# Patient Record
Sex: FEMALE | Race: OTHER | NOT HISPANIC OR LATINO
[De-identification: names, ages, dates, MRNs, and addresses within clinical notes are randomized per-mention and may not be internally consistent; named-entity substitution may affect disease eponyms.]

---

## 2017-01-27 ENCOUNTER — NON-APPOINTMENT (OUTPATIENT)
Age: 62
End: 2017-01-27

## 2017-01-27 ENCOUNTER — APPOINTMENT (OUTPATIENT)
Dept: CARDIOLOGY | Facility: CLINIC | Age: 62
End: 2017-01-27

## 2017-01-27 VITALS
HEART RATE: 79 BPM | HEIGHT: 60 IN | OXYGEN SATURATION: 98 % | BODY MASS INDEX: 29.06 KG/M2 | DIASTOLIC BLOOD PRESSURE: 76 MMHG | WEIGHT: 148 LBS | SYSTOLIC BLOOD PRESSURE: 134 MMHG

## 2017-01-30 LAB
ALBUMIN SERPL ELPH-MCNC: 4.8 G/DL
ALP BLD-CCNC: 84 U/L
ALT SERPL-CCNC: 22 U/L
ANION GAP SERPL CALC-SCNC: 18 MMOL/L
AST SERPL-CCNC: 16 U/L
BASOPHILS # BLD AUTO: 0.05 K/UL
BASOPHILS NFR BLD AUTO: 0.6 %
BILIRUB SERPL-MCNC: 0.2 MG/DL
BUN SERPL-MCNC: 26 MG/DL
CALCIUM SERPL-MCNC: 10.3 MG/DL
CHLORIDE SERPL-SCNC: 101 MMOL/L
CHOLEST SERPL-MCNC: 260 MG/DL
CHOLEST/HDLC SERPL: 3.4 RATIO
CO2 SERPL-SCNC: 25 MMOL/L
CREAT SERPL-MCNC: 0.74 MG/DL
EOSINOPHIL # BLD AUTO: 0.16 K/UL
EOSINOPHIL NFR BLD AUTO: 1.8 %
GLUCOSE SERPL-MCNC: 95 MG/DL
HBA1C MFR BLD HPLC: 5.9 %
HCT VFR BLD CALC: 41.7 %
HDLC SERPL-MCNC: 77 MG/DL
HGB BLD-MCNC: 13.3 G/DL
IMM GRANULOCYTES NFR BLD AUTO: 0.2 %
LDLC SERPL CALC-MCNC: 142 MG/DL
LYMPHOCYTES # BLD AUTO: 3.5 K/UL
LYMPHOCYTES NFR BLD AUTO: 39.4 %
MAN DIFF?: NORMAL
MCHC RBC-ENTMCNC: 28.3 PG
MCHC RBC-ENTMCNC: 31.9 GM/DL
MCV RBC AUTO: 88.7 FL
MONOCYTES # BLD AUTO: 0.5 K/UL
MONOCYTES NFR BLD AUTO: 5.6 %
NEUTROPHILS # BLD AUTO: 4.66 K/UL
NEUTROPHILS NFR BLD AUTO: 52.4 %
PLATELET # BLD AUTO: 274 K/UL
POTASSIUM SERPL-SCNC: 3.8 MMOL/L
PROT SERPL-MCNC: 7.3 G/DL
RBC # BLD: 4.7 M/UL
RBC # FLD: 13.9 %
SODIUM SERPL-SCNC: 144 MMOL/L
TRIGL SERPL-MCNC: 204 MG/DL
TSH SERPL-ACNC: 0.94 UIU/ML
WBC # FLD AUTO: 8.89 K/UL

## 2017-06-18 ENCOUNTER — INPATIENT (INPATIENT)
Facility: HOSPITAL | Age: 62
LOS: 3 days | Discharge: HOME | DRG: 390 | End: 2017-06-22
Admitting: HOSPITALIST
Payer: COMMERCIAL

## 2017-06-18 VITALS
SYSTOLIC BLOOD PRESSURE: 162 MMHG | RESPIRATION RATE: 20 BRPM | TEMPERATURE: 98 F | OXYGEN SATURATION: 98 % | DIASTOLIC BLOOD PRESSURE: 77 MMHG | HEART RATE: 71 BPM

## 2017-06-18 LAB
ALBUMIN SERPL ELPH-MCNC: 4.1 G/DL — SIGNIFICANT CHANGE UP (ref 3.3–5)
ALP SERPL-CCNC: 62 U/L — SIGNIFICANT CHANGE UP (ref 40–120)
ALT FLD-CCNC: 32 U/L RC — SIGNIFICANT CHANGE UP (ref 10–45)
ANION GAP SERPL CALC-SCNC: 16 MMOL/L — SIGNIFICANT CHANGE UP (ref 5–17)
APPEARANCE UR: CLEAR — SIGNIFICANT CHANGE UP
APTT BLD: 32.3 SEC — SIGNIFICANT CHANGE UP (ref 27.5–37.4)
AST SERPL-CCNC: 21 U/L — SIGNIFICANT CHANGE UP (ref 10–40)
BACTERIA # UR AUTO: ABNORMAL /HPF
BASE EXCESS BLDV CALC-SCNC: 4.3 MMOL/L — HIGH (ref -2–2)
BASOPHILS # BLD AUTO: 0.1 K/UL — SIGNIFICANT CHANGE UP (ref 0–0.2)
BASOPHILS NFR BLD AUTO: 1 % — SIGNIFICANT CHANGE UP (ref 0–2)
BILIRUB SERPL-MCNC: 0.3 MG/DL — SIGNIFICANT CHANGE UP (ref 0.2–1.2)
BILIRUB UR-MCNC: NEGATIVE — SIGNIFICANT CHANGE UP
BUN SERPL-MCNC: 19 MG/DL — SIGNIFICANT CHANGE UP (ref 7–23)
CA-I SERPL-SCNC: 1.32 MMOL/L — HIGH (ref 1.12–1.3)
CALCIUM SERPL-MCNC: 10 MG/DL — SIGNIFICANT CHANGE UP (ref 8.4–10.5)
CHLORIDE BLDV-SCNC: 101 MMOL/L — SIGNIFICANT CHANGE UP (ref 96–108)
CHLORIDE SERPL-SCNC: 101 MMOL/L — SIGNIFICANT CHANGE UP (ref 96–108)
CO2 BLDV-SCNC: 33 MMOL/L — HIGH (ref 22–30)
CO2 SERPL-SCNC: 26 MMOL/L — SIGNIFICANT CHANGE UP (ref 22–31)
COLOR SPEC: YELLOW — SIGNIFICANT CHANGE UP
CREAT SERPL-MCNC: 0.7 MG/DL — SIGNIFICANT CHANGE UP (ref 0.5–1.3)
DIFF PNL FLD: NEGATIVE — SIGNIFICANT CHANGE UP
EOSINOPHIL # BLD AUTO: 0.1 K/UL — SIGNIFICANT CHANGE UP (ref 0–0.5)
EOSINOPHIL NFR BLD AUTO: 1.2 % — SIGNIFICANT CHANGE UP (ref 0–6)
EPI CELLS # UR: SIGNIFICANT CHANGE UP /HPF
GAS PNL BLDV: 140 MMOL/L — SIGNIFICANT CHANGE UP (ref 136–145)
GAS PNL BLDV: SIGNIFICANT CHANGE UP
GAS PNL BLDV: SIGNIFICANT CHANGE UP
GLUCOSE BLDV-MCNC: 97 MG/DL — SIGNIFICANT CHANGE UP (ref 70–99)
GLUCOSE SERPL-MCNC: 99 MG/DL — SIGNIFICANT CHANGE UP (ref 70–99)
GLUCOSE UR QL: NEGATIVE — SIGNIFICANT CHANGE UP
HCO3 BLDV-SCNC: 31 MMOL/L — HIGH (ref 21–29)
HCT VFR BLD CALC: 37 % — SIGNIFICANT CHANGE UP (ref 34.5–45)
HCT VFR BLDA CALC: 40 % — SIGNIFICANT CHANGE UP (ref 39–50)
HGB BLD CALC-MCNC: 13.1 G/DL — SIGNIFICANT CHANGE UP (ref 11.5–15.5)
HGB BLD-MCNC: 13 G/DL — SIGNIFICANT CHANGE UP (ref 11.5–15.5)
INR BLD: 1.03 RATIO — SIGNIFICANT CHANGE UP (ref 0.88–1.16)
KETONES UR-MCNC: NEGATIVE — SIGNIFICANT CHANGE UP
LACTATE BLDV-MCNC: 1.5 MMOL/L — SIGNIFICANT CHANGE UP (ref 0.7–2)
LEUKOCYTE ESTERASE UR-ACNC: ABNORMAL
LYMPHOCYTES # BLD AUTO: 3.4 K/UL — HIGH (ref 1–3.3)
LYMPHOCYTES # BLD AUTO: 32.9 % — SIGNIFICANT CHANGE UP (ref 13–44)
MCHC RBC-ENTMCNC: 31.4 PG — SIGNIFICANT CHANGE UP (ref 27–34)
MCHC RBC-ENTMCNC: 35.2 GM/DL — SIGNIFICANT CHANGE UP (ref 32–36)
MCV RBC AUTO: 89.3 FL — SIGNIFICANT CHANGE UP (ref 80–100)
MONOCYTES # BLD AUTO: 0.5 K/UL — SIGNIFICANT CHANGE UP (ref 0–0.9)
MONOCYTES NFR BLD AUTO: 4.7 % — SIGNIFICANT CHANGE UP (ref 2–14)
NEUTROPHILS # BLD AUTO: 6.3 K/UL — SIGNIFICANT CHANGE UP (ref 1.8–7.4)
NEUTROPHILS NFR BLD AUTO: 60.2 % — SIGNIFICANT CHANGE UP (ref 43–77)
NITRITE UR-MCNC: NEGATIVE — SIGNIFICANT CHANGE UP
PCO2 BLDV: 57 MMHG — HIGH (ref 35–50)
PH BLDV: 7.35 — SIGNIFICANT CHANGE UP (ref 7.35–7.45)
PH UR: 6.5 — SIGNIFICANT CHANGE UP (ref 5–8)
PLATELET # BLD AUTO: 230 K/UL — SIGNIFICANT CHANGE UP (ref 150–400)
PO2 BLDV: 21 MMHG — LOW (ref 25–45)
POTASSIUM BLDV-SCNC: 3.6 MMOL/L — SIGNIFICANT CHANGE UP (ref 3.5–5)
POTASSIUM SERPL-MCNC: 3.8 MMOL/L — SIGNIFICANT CHANGE UP (ref 3.5–5.3)
POTASSIUM SERPL-SCNC: 3.8 MMOL/L — SIGNIFICANT CHANGE UP (ref 3.5–5.3)
PROT SERPL-MCNC: 6.8 G/DL — SIGNIFICANT CHANGE UP (ref 6–8.3)
PROT UR-MCNC: 30 MG/DL
PROTHROM AB SERPL-ACNC: 11.2 SEC — SIGNIFICANT CHANGE UP (ref 9.8–12.7)
RBC # BLD: 4.14 M/UL — SIGNIFICANT CHANGE UP (ref 3.8–5.2)
RBC # FLD: 11.9 % — SIGNIFICANT CHANGE UP (ref 10.3–14.5)
RBC CASTS # UR COMP ASSIST: SIGNIFICANT CHANGE UP /HPF (ref 0–2)
SAO2 % BLDV: 28 % — LOW (ref 67–88)
SODIUM SERPL-SCNC: 143 MMOL/L — SIGNIFICANT CHANGE UP (ref 135–145)
SP GR SPEC: 1.02 — SIGNIFICANT CHANGE UP (ref 1.01–1.02)
UROBILINOGEN FLD QL: NEGATIVE — SIGNIFICANT CHANGE UP
WBC # BLD: 10.5 K/UL — SIGNIFICANT CHANGE UP (ref 3.8–10.5)
WBC # FLD AUTO: 10.5 K/UL — SIGNIFICANT CHANGE UP (ref 3.8–10.5)
WBC UR QL: SIGNIFICANT CHANGE UP /HPF (ref 0–5)

## 2017-06-18 PROCEDURE — 93010 ELECTROCARDIOGRAM REPORT: CPT | Mod: 59

## 2017-06-18 PROCEDURE — 99285 EMERGENCY DEPT VISIT HI MDM: CPT | Mod: 25

## 2017-06-18 PROCEDURE — 74020: CPT | Mod: 26

## 2017-06-18 PROCEDURE — 74177 CT ABD & PELVIS W/CONTRAST: CPT | Mod: 26

## 2017-06-18 RX ORDER — MORPHINE SULFATE 50 MG/1
4 CAPSULE, EXTENDED RELEASE ORAL EVERY 4 HOURS
Qty: 0 | Refills: 0 | Status: DISCONTINUED | OUTPATIENT
Start: 2017-06-18 | End: 2017-06-19

## 2017-06-18 RX ORDER — SODIUM CHLORIDE 9 MG/ML
1000 INJECTION INTRAMUSCULAR; INTRAVENOUS; SUBCUTANEOUS ONCE
Qty: 0 | Refills: 0 | Status: COMPLETED | OUTPATIENT
Start: 2017-06-18 | End: 2017-06-18

## 2017-06-18 RX ORDER — HYDROCORTISONE 20 MG
100 TABLET ORAL EVERY 8 HOURS
Qty: 0 | Refills: 0 | Status: DISCONTINUED | OUTPATIENT
Start: 2017-06-18 | End: 2017-06-19

## 2017-06-18 RX ORDER — DIPHENHYDRAMINE HCL 50 MG
50 CAPSULE ORAL ONCE
Qty: 0 | Refills: 0 | Status: COMPLETED | OUTPATIENT
Start: 2017-06-18 | End: 2017-06-18

## 2017-06-18 RX ORDER — KETOROLAC TROMETHAMINE 30 MG/ML
15 SYRINGE (ML) INJECTION ONCE
Qty: 0 | Refills: 0 | Status: COMPLETED | OUTPATIENT
Start: 2017-06-18 | End: 2017-06-23

## 2017-06-18 RX ORDER — ONDANSETRON 8 MG/1
8 TABLET, FILM COATED ORAL ONCE
Qty: 0 | Refills: 0 | Status: COMPLETED | OUTPATIENT
Start: 2017-06-18 | End: 2017-06-18

## 2017-06-18 RX ORDER — MORPHINE SULFATE 50 MG/1
6 CAPSULE, EXTENDED RELEASE ORAL ONCE
Qty: 0 | Refills: 0 | Status: DISCONTINUED | OUTPATIENT
Start: 2017-06-18 | End: 2017-06-18

## 2017-06-18 RX ORDER — ONDANSETRON 8 MG/1
4 TABLET, FILM COATED ORAL EVERY 4 HOURS
Qty: 0 | Refills: 0 | Status: DISCONTINUED | OUTPATIENT
Start: 2017-06-18 | End: 2017-06-20

## 2017-06-18 RX ORDER — ACETAMINOPHEN 500 MG
1000 TABLET ORAL ONCE
Qty: 0 | Refills: 0 | Status: COMPLETED | OUTPATIENT
Start: 2017-06-18 | End: 2017-06-18

## 2017-06-18 RX ORDER — KETOROLAC TROMETHAMINE 30 MG/ML
15 SYRINGE (ML) INJECTION ONCE
Qty: 0 | Refills: 0 | Status: DISCONTINUED | OUTPATIENT
Start: 2017-06-18 | End: 2017-06-18

## 2017-06-18 RX ADMIN — SODIUM CHLORIDE 1000 MILLILITER(S): 9 INJECTION INTRAMUSCULAR; INTRAVENOUS; SUBCUTANEOUS at 18:09

## 2017-06-18 RX ADMIN — MORPHINE SULFATE 4 MILLIGRAM(S): 50 CAPSULE, EXTENDED RELEASE ORAL at 21:25

## 2017-06-18 RX ADMIN — ONDANSETRON 8 MILLIGRAM(S): 8 TABLET, FILM COATED ORAL at 18:16

## 2017-06-18 RX ADMIN — MORPHINE SULFATE 4 MILLIGRAM(S): 50 CAPSULE, EXTENDED RELEASE ORAL at 23:00

## 2017-06-18 RX ADMIN — Medication 50 MILLIGRAM(S): at 21:23

## 2017-06-18 RX ADMIN — Medication 100 MILLIGRAM(S): at 18:08

## 2017-06-18 RX ADMIN — Medication 15 MILLIGRAM(S): at 18:55

## 2017-06-18 RX ADMIN — Medication 15 MILLIGRAM(S): at 20:19

## 2017-06-18 RX ADMIN — MORPHINE SULFATE 6 MILLIGRAM(S): 50 CAPSULE, EXTENDED RELEASE ORAL at 20:19

## 2017-06-18 RX ADMIN — Medication 1000 MILLIGRAM(S): at 18:19

## 2017-06-18 RX ADMIN — MORPHINE SULFATE 6 MILLIGRAM(S): 50 CAPSULE, EXTENDED RELEASE ORAL at 18:18

## 2017-06-18 RX ADMIN — Medication 400 MILLIGRAM(S): at 18:04

## 2017-06-18 NOTE — ED PROVIDER NOTE - ATTENDING CONTRIBUTION TO CARE
Patient with nausea/vomiting and decreased bowel movements. Moderate abdominal pain. LLQ in location. Persistent. Patient has not gotten better/improved with this management.   high pitched abdominal sounds, ncat, trachea midline, mild to moderate distress secondary to symptoms. cooperative, alert, no rashes, generally tenderness to palpation, no rebound/guarding, no peritoneal signs, no edema  will get iv, labs, abdominal series, analgesia prn, fluids, surgery consult prn Ct results and reassess  Will follow up on labs, analgesia, reassess and disposition as clinically indicated.

## 2017-06-18 NOTE — ED PROVIDER NOTE - PROGRESS NOTE DETAILS
patient resting comfortably, pain improved, awaiting CT Pasha Bergman MD (resident): pt reassessed after signout and has abd pain, generalized worse in the LLQ, TTP in that region, passing gas and not concerned for SBO. Since pt continues to have pain, would prefere to stay in the hospital for further w/u of CT findings. GI called, awaiting call back. D/w GI Dr. Ramirez, who will assess pt in house. D/w Dr. Berger who agrees w/ plan of care

## 2017-06-18 NOTE — ED ADULT NURSE NOTE - CHPI ED SYMPTOMS NEG
no fever/no vomiting/no hematuria/no diarrhea/no abdominal distension/no dysuria/no burning urination/no chills

## 2017-06-18 NOTE — ED PROVIDER NOTE - OBJECTIVE STATEMENT
62 year old female, past medical history HTN, asthma, who presents to the ED for abdominal pain for 2 days. Reports diffuse abdominal pain, worse left lower quadrant, reports it radiates up towards epigastrium, peptobismul and tylenol without relief, no aggravating factors, unchanged with food, 8/10, sharp, nausea without vomiting, no fevers or chills. No chest pain or shortness of breath. No diarrhea, constipation, hematochezia, melena. No sick contacts or recent travel. No recent abx. Last colonoscopy was 1 year ago which patients reports was normal.     primary medical doctor: Dr. Rafy Willard ([place])   GI: Dr. Marita Rinaldi

## 2017-06-18 NOTE — ED ADULT NURSE NOTE - OBJECTIVE STATEMENT
62y female pt arrived to ED aox4 c/o generalized abd pain mainly on LLQ with nausea started last night. No vomit/diarrhea, no fever/chills, no chest pain or sob, tender on LLQ with palpation, denies any urinary deficits, normal BM this morning. Tried Pepto Bismol and Tylenol without relief.

## 2017-06-18 NOTE — ED PROVIDER NOTE - MEDICAL DECISION MAKING DETAILS
62 year old female, past medical history HTN, asthma, who presents to the ED for abdominal pain for 2 days. Likely Diverticulitis vs colitis. patient with rebound will obtain xray for perforation. Low supicion though as patient without guarding and abdomen is soft. Will obtain labwork, ct (reports rash with iodine so will pretreat). pain and nausea control.

## 2017-06-19 DIAGNOSIS — R10.9 UNSPECIFIED ABDOMINAL PAIN: ICD-10-CM

## 2017-06-19 DIAGNOSIS — Z29.9 ENCOUNTER FOR PROPHYLACTIC MEASURES, UNSPECIFIED: ICD-10-CM

## 2017-06-19 DIAGNOSIS — Z98.890 OTHER SPECIFIED POSTPROCEDURAL STATES: Chronic | ICD-10-CM

## 2017-06-19 DIAGNOSIS — J45.909 UNSPECIFIED ASTHMA, UNCOMPLICATED: ICD-10-CM

## 2017-06-19 DIAGNOSIS — I10 ESSENTIAL (PRIMARY) HYPERTENSION: ICD-10-CM

## 2017-06-19 LAB
CULTURE RESULTS: NO GROWTH — SIGNIFICANT CHANGE UP
SPECIMEN SOURCE: SIGNIFICANT CHANGE UP

## 2017-06-19 PROCEDURE — 99223 1ST HOSP IP/OBS HIGH 75: CPT | Mod: GC

## 2017-06-19 PROCEDURE — 71010: CPT | Mod: 26

## 2017-06-19 RX ORDER — FAMOTIDINE 10 MG/ML
20 INJECTION INTRAVENOUS
Qty: 0 | Refills: 0 | Status: DISCONTINUED | OUTPATIENT
Start: 2017-06-19 | End: 2017-06-20

## 2017-06-19 RX ORDER — SODIUM CHLORIDE 9 MG/ML
1000 INJECTION INTRAMUSCULAR; INTRAVENOUS; SUBCUTANEOUS
Qty: 0 | Refills: 0 | Status: DISCONTINUED | OUTPATIENT
Start: 2017-06-19 | End: 2017-06-20

## 2017-06-19 RX ORDER — METOPROLOL TARTRATE 50 MG
0 TABLET ORAL
Qty: 0 | Refills: 0 | COMMUNITY

## 2017-06-19 RX ORDER — SENNA PLUS 8.6 MG/1
2 TABLET ORAL AT BEDTIME
Qty: 0 | Refills: 0 | Status: DISCONTINUED | OUTPATIENT
Start: 2017-06-19 | End: 2017-06-19

## 2017-06-19 RX ORDER — ACETAMINOPHEN 500 MG
1000 TABLET ORAL ONCE
Qty: 0 | Refills: 0 | Status: DISCONTINUED | OUTPATIENT
Start: 2017-06-19 | End: 2017-06-20

## 2017-06-19 RX ORDER — OXYCODONE HYDROCHLORIDE 5 MG/1
5 TABLET ORAL EVERY 6 HOURS
Qty: 0 | Refills: 0 | Status: DISCONTINUED | OUTPATIENT
Start: 2017-06-19 | End: 2017-06-20

## 2017-06-19 RX ORDER — METOPROLOL TARTRATE 50 MG
50 TABLET ORAL DAILY
Qty: 0 | Refills: 0 | Status: DISCONTINUED | OUTPATIENT
Start: 2017-06-19 | End: 2017-06-19

## 2017-06-19 RX ORDER — MORPHINE SULFATE 50 MG/1
2 CAPSULE, EXTENDED RELEASE ORAL EVERY 8 HOURS
Qty: 0 | Refills: 0 | Status: DISCONTINUED | OUTPATIENT
Start: 2017-06-19 | End: 2017-06-20

## 2017-06-19 RX ORDER — DOCUSATE SODIUM 100 MG
100 CAPSULE ORAL THREE TIMES A DAY
Qty: 0 | Refills: 0 | Status: DISCONTINUED | OUTPATIENT
Start: 2017-06-19 | End: 2017-06-19

## 2017-06-19 RX ORDER — METOPROLOL TARTRATE 50 MG
1 TABLET ORAL
Qty: 0 | Refills: 0 | COMMUNITY

## 2017-06-19 RX ORDER — MORPHINE SULFATE 50 MG/1
1 CAPSULE, EXTENDED RELEASE ORAL ONCE
Qty: 0 | Refills: 0 | Status: DISCONTINUED | OUTPATIENT
Start: 2017-06-19 | End: 2017-06-19

## 2017-06-19 RX ORDER — RANITIDINE HYDROCHLORIDE 150 MG/1
2 TABLET, FILM COATED ORAL
Qty: 0 | Refills: 0 | COMMUNITY

## 2017-06-19 RX ORDER — METOPROLOL TARTRATE 50 MG
25 TABLET ORAL
Qty: 0 | Refills: 0 | Status: DISCONTINUED | OUTPATIENT
Start: 2017-06-19 | End: 2017-06-20

## 2017-06-19 RX ORDER — CYCLOBENZAPRINE HYDROCHLORIDE 10 MG/1
1 TABLET, FILM COATED ORAL
Qty: 0 | Refills: 0 | COMMUNITY

## 2017-06-19 RX ORDER — ENOXAPARIN SODIUM 100 MG/ML
40 INJECTION SUBCUTANEOUS DAILY
Qty: 0 | Refills: 0 | Status: DISCONTINUED | OUTPATIENT
Start: 2017-06-19 | End: 2017-06-22

## 2017-06-19 RX ORDER — ACETAMINOPHEN 500 MG
1000 TABLET ORAL EVERY 8 HOURS
Qty: 0 | Refills: 0 | Status: COMPLETED | OUTPATIENT
Start: 2017-06-19 | End: 2017-06-19

## 2017-06-19 RX ORDER — CHLORTHALIDONE 50 MG
1 TABLET ORAL
Qty: 0 | Refills: 0 | COMMUNITY

## 2017-06-19 RX ADMIN — MORPHINE SULFATE 1 MILLIGRAM(S): 50 CAPSULE, EXTENDED RELEASE ORAL at 20:05

## 2017-06-19 RX ADMIN — MORPHINE SULFATE 4 MILLIGRAM(S): 50 CAPSULE, EXTENDED RELEASE ORAL at 05:41

## 2017-06-19 RX ADMIN — OXYCODONE HYDROCHLORIDE 5 MILLIGRAM(S): 5 TABLET ORAL at 22:20

## 2017-06-19 RX ADMIN — ENOXAPARIN SODIUM 40 MILLIGRAM(S): 100 INJECTION SUBCUTANEOUS at 12:58

## 2017-06-19 RX ADMIN — OXYCODONE HYDROCHLORIDE 5 MILLIGRAM(S): 5 TABLET ORAL at 23:20

## 2017-06-19 RX ADMIN — MORPHINE SULFATE 4 MILLIGRAM(S): 50 CAPSULE, EXTENDED RELEASE ORAL at 01:07

## 2017-06-19 RX ADMIN — FAMOTIDINE 20 MILLIGRAM(S): 10 INJECTION INTRAVENOUS at 13:03

## 2017-06-19 RX ADMIN — SODIUM CHLORIDE 75 MILLILITER(S): 9 INJECTION INTRAMUSCULAR; INTRAVENOUS; SUBCUTANEOUS at 09:40

## 2017-06-19 RX ADMIN — Medication 100 MILLIGRAM(S): at 06:14

## 2017-06-19 RX ADMIN — MORPHINE SULFATE 2 MILLIGRAM(S): 50 CAPSULE, EXTENDED RELEASE ORAL at 14:53

## 2017-06-19 RX ADMIN — Medication 25 MILLIGRAM(S): at 18:04

## 2017-06-19 RX ADMIN — MORPHINE SULFATE 1 MILLIGRAM(S): 50 CAPSULE, EXTENDED RELEASE ORAL at 20:20

## 2017-06-19 RX ADMIN — SODIUM CHLORIDE 75 MILLILITER(S): 9 INJECTION INTRAMUSCULAR; INTRAVENOUS; SUBCUTANEOUS at 18:06

## 2017-06-19 RX ADMIN — MORPHINE SULFATE 4 MILLIGRAM(S): 50 CAPSULE, EXTENDED RELEASE ORAL at 05:28

## 2017-06-19 RX ADMIN — ONDANSETRON 4 MILLIGRAM(S): 8 TABLET, FILM COATED ORAL at 12:58

## 2017-06-19 RX ADMIN — Medication 400 MILLIGRAM(S): at 09:40

## 2017-06-19 RX ADMIN — MORPHINE SULFATE 4 MILLIGRAM(S): 50 CAPSULE, EXTENDED RELEASE ORAL at 03:39

## 2017-06-19 RX ADMIN — Medication 1000 MILLIGRAM(S): at 10:10

## 2017-06-19 RX ADMIN — Medication 100 MILLIGRAM(S): at 14:53

## 2017-06-19 NOTE — H&P ADULT - PROBLEM SELECTOR PLAN 3
-resume home metoprolol and thiazide-like drug  -monitor BP -resume home metoprolol and HCTZ  -monitor BP

## 2017-06-19 NOTE — PROVIDER CONTACT NOTE (OTHER) - SITUATION
Pt reports no bm since yesterday yet abdomen distended. Pt reports passing gas with blood tinged on toilet paper tonight.

## 2017-06-19 NOTE — ED ADULT NURSE REASSESSMENT NOTE - NS ED NURSE REASSESS COMMENT FT1
Back from CT, no distress noted at this time, pending result for disposition.
Medicated for pain as ordered and pending GI consult.
Pt resting in hallway, has some complaints of increasing pain, additional pain medication given, Pt tolerated well, VSS IV site clear ro redness or swelling, a&ox4, waiting for room disposition.
Received report from nurse Karen Flores.  No acute distress noted, v/s obtained.  Pt admitted, waiting bed assignment.
Pt states that pain is much tolerable after multiple pain meds, drinking oral contrast for CT and pending CT between [id number].

## 2017-06-19 NOTE — H&P ADULT - PROBLEM SELECTOR PLAN 1
-possibly partial SBO as per CT scan  -NPO for now  -no NGT warranted as per GI  -no surgical intervention at this point as per surgery  -pain control w/Ofirmev -likely partial SBO as per CT scan  -NPO for now  -no NGT warranted as per GI  -no surgical intervention at this point as per surgery  -pain control w/Ofirmev  -monitor vitals  -zofran for nausea -likely SBO as per CT scan  -NPO for now  -no NGT warranted as per GI  -no surgical intervention at this point as per surgery  -pain control w/Ofirmev  -monitor vitals, CBC, lactate  -zofran for nausea

## 2017-06-19 NOTE — CONSULT NOTE ADULT - SUBJECTIVE AND OBJECTIVE BOX
GI CONSULT NOTE  SUBJECTIVE:  61 yo woman known to me.  Yesterday morning, awoke with diffuse abdominal pain, but worst in the LLQ.  She had a normal, formed bowel movement -- non-bloody.  Patient took Pepto Bismol for "stomach ache", and went shopping at [employer], but had to leave [employer] because she was feeling the abdominal pain.  She called her primary doctor, Dr. Willard, and he advised that she try aspirin, but if no relief, then to consider coming to the hospital.  Patient arrived to ER triage about 2 pm, and waited about 2 - 2.5 hours to be seen by ER team (patient's  called me while she was waiting, complaining that she was in pain, and that the emergency room was busy).  The patient finished drinking her CT scan contrast in the evening, and underwent CT scan of the abdomen, which showed dilated small bowel loops with multiple transition points and SB luminal filling defects.    The patient's abdominal surgical history includes right sided ovarian cyst removal in  -- per patient, prior to surgery, endometriosis was suspected, but was not confirmed after surgery.  Per patient, "I had 19 cysts".  The patient conceived her son, and he was born via  in 2000.  During her , "they nicked my bowel, and it had to be sewn".  The patient did have one SBO after her , which resolved without surgical intervention.  The patient has had chronic intermittent abdominal pains.  She also gets acid reflux symptoms.  She had an EGD/colonoscopy by me on 16, and both were normal aside from hemorrhoids noted during colonoscopy.      The patient now reports feeling better, though still distended.  She has not yet passed the oral CT scan contrast per anorectum.  She is mildly nauseated and feeling acid reflux, though did not vomit any time yesterday or this morning.  No fevers or chills.  She expresses concerns about this week, because her 16 yo son (a senior in high school who will be attending [employer] in the fall) has his prom 17, and his graduation 17.    ______________________________________________________________________  PMH/PSH:  PAST MEDICAL & SURGICAL HISTORY:  HTN (hypertension)  History of ovarian surgery ()  History of  Section (2000)  Asthma    ______________________________________________________________________  MEDS:  MEDICATIONS  (STANDING):  hydrocortisone  Injectable 100milliGRAM(s) IV Push every 8 hours    MEDICATIONS  (PRN):  ondansetron Injectable 4milliGRAM(s) IV Push every 4 hours PRN Nausea and/or Vomiting    ______________________________________________________________________  ALL:   iodine (Unknown)  latex (Unknown)  No Known Drug Allergies  ______________________________________________________________________  SH:  Patient is a dental hygienist.  Her  is a dentist.  They have one son.  Patient denies toxic habits.    ______________________________________________________________________  FH:  Unremarkable    ______________________________________________________________________  ROS:  General:  Denies weight loss.  In fact, weight gain.  Skin/Breast:  Mild neck itching after IV CT scan contrast.  Patient received prophylaxis with benadryl.  There is no visible rash at this time.  Ophthalmologic:  Normal. Vision correction.  ENMT:	Normal  Respiratory and Thorax:  Normal  Cardiovascular:	Normal  Gastrointestinal:	  As above  Genitourinary:	Normal  Musculoskeletal:  Normal  Neurological:  Normal  Psychiatric:  Normal  Hematology/Lymphatics:  Normal	  Endocrine:  Normal  ______________________________________________________________________  PHYSICAL EXAM:  T(C): 36.6, Max: 36.7 ( @ 17:52)  HR: 67  BP: 128/69  RR: 16  SpO2: 95%  Wt(kg): --  PHYSICAL EXAM:    Constitutional:  Lying on ER stretcher, NAD.  Talking to her  by phone.  HEENT:  Normal  Neck:  Supple  Respiratory:  Clear bilaterally  Cardiovascular:  Regular  Gastrointestinal:  Distended, soft.  Mild tenderness mostly in the LLQ.  No rebound.  No guard.  Rectal:  Deferred.  Patient in the ER hallway.  Extremities:   Normal  Neurological:  Normal    ______________________________________________________________________  LABS:                        13.0   10.5  )-----------( 230      ( 2017 18:09 )             37.0     06-18    143  |  101  |  19  ----------------------------<  99  3.8   |  26  |  0.70    Ca    10.0      2017 18:09    TPro  6.8  /  Alb  4.1  /  TBili  0.3  /  DBili  x   /  AST  21  /  ALT  32  /  AlkPhos  62  18    LIVER FUNCTIONS - ( 2017 18:09 )  Alb: 4.1 g/dL / Pro: 6.8 g/dL / ALK PHOS: 62 U/L / ALT: 32 U/L RC / AST: 21 U/L / GGT: x           PT/INR - ( 2017 18:09 )   PT: 11.2 sec;   INR: 1.03 ratio         PTT - ( 2017 18:09 )  PTT:32.3 sec    CT abd/pel findings as above.    ______________________________________________________________________  ASSESSMENT:  I suspect that the patient has a small bowel obstruction secondary to adhesions from prior surgeries.  SB luminal filling defects hopefully food debris, and not anything malignant.      PLAN:  - Admit for pain management, observation.  - Surgery consultation.  - Keep NPO for now.  Will monitor symptoms, bowel movements.  - I do not think that the patient needs an NGT now.    The accepting hospitalist per [place] is Dr. Rhonda Berger.  I left a message with the [facility] answering service.  Alecia Rinaldi MD  (O) [phone]

## 2017-06-19 NOTE — CONSULT NOTE ADULT - SUBJECTIVE AND OBJECTIVE BOX
[facility] SURGERY (#[id number]) CONSULT NOTE  ---------------------------------------------------------------------------------------------     HPI: Patient is a 62y old  Female who presents with a chief complaint of abdominal pain since yesterday morning.  Patient also had some nausea, no emesis. Patient had a normal bowel movement yesterday morning as well, with no BM or flatus since then.  Patient has been complaining of increasing "reflux"-like symptoms for the past several months, and now takes Zantac twice a day for symptom control.  Patient admitted to the medical service, GI consulted and requested surgical consult for following the bowel obstruction.  Per patient, last colonoscopy within the past 1 year with no significant findings, never had upper endoscopy.      ROS: 10-system review is otherwise negative except HPI above.      PAST MEDICAL & SURGICAL HISTORY:  HTN (hypertension)  History of Myomectomy  History of  Section with SBR for small bowel injury  Asthma    FAMILY HISTORY:  [] Family history not pertinent as reviewed with the patient and family    ALLERGIES: iodine (Unknown)  latex (Unknown)  No Known Drug Allergies    HOME MEDICATIONS:     CURRENT MEDICATIONS  MEDICATIONS (STANDING): hydrocortisone  Injectable 100milliGRAM(s) IV Push every 8 hours  sodium chloride 0.9%. 1000milliLiter(s) IV Continuous <Continuous>  docusate sodium 100milliGRAM(s) Oral three times a day  metoprolol 25milliGRAM(s) Oral two times a day    MEDICATIONS (PRN):ondansetron Injectable 4milliGRAM(s) IV Push every 4 hours PRN Nausea and/or Vomiting  aluminum hydroxide/magnesium hydroxide/simethicone Suspension 30milliLiter(s) Oral every 4 hours PRN Dyspepsia  senna 2Tablet(s) Oral at bedtime PRN Constipation    --------------------------------------------------------------------------------------------    Vitals:   T(C): 36.6, Max: 36.7 ( @ 17:52)  HR: 67 (59 - 71)  BP: 128/69 (128/69 - 166/81)  BP(mean): --  RR: 16 (16 - 20)  SpO2: 95% (95% - 100%)  Wt(kg): --  CAPILLARY BLOOD GLUCOSE      PHYSICAL EXAM:   General: NAD, Lying in bed, slightly uncomfortable secondary to abdominal pain  Neuro: A+Ox3  HEENT: NC/AT, EOMI  Cardio: RRR, nml S1/S2  Resp: Good effort, CTA b/l  GI/Abd: Soft, mildly distended, mild tenderness to deep palpation in left upper and left lower quadrants, no guarding, no masses palpated  Vascular: All 4 extremities warm.  Musculoskeletal: All 4 extremities moving spontaneously, no limitations    --------------------------------------------------------------------------------------------    LABS  CBC ( 18:09)                              13.0                           10.5    )----------------(  230        60.2  % Neutrophils, 32.9  % Lymphocytes, ANC: 6.3                                 37.0      BMP ( 18:09)             143     |  101     |  19    		Ca++ --      Ca 10.0               ---------------------------------( 99    		Mg --                 3.8     |  26      |  0.70  			Ph --        LFTs ( 18:09)      TPro 6.8 / Alb 4.1 / TBili 0.3 / DBili -- / AST 21 / ALT 32 / AlkPhos 62    Coags ( 18:09)  aPTT 32.3 / INR 1.03 / PT 11.2    VBG ( @ 18:09)     7.35 / 57<H> / 21<L> / 31<H> / 4.3<H> / 28<L>%     Lactate: 1.5    --------------------------------------------------------------------------------------------    MICROBIOLOGY  Urinalysis ( @ 18:09):     Color: Yellow / Appearance: Clear / S.025 / pH: 6.5 / Gluc: Negative / Ketones: Negative / Bili: Negative / Urobili: Negative / Protein :30<!> / Nitrites: Negative / Leuk.Est: Small<!> / RBC: 3-5 / WBC: 3-5 / Sq Epi:  / Non Sq Epi: OCC / Bacteria Few<!>         --------------------------------------------------------------------------------------------    IMAGING  CT: Dilated small bowel loops with multiple transition points an apparent luminal filling defects. Further evaluation is recommended.    --------------------------------------------------------------------------------------------

## 2017-06-19 NOTE — H&P ADULT - NSHPLABSRESULTS_GEN_ALL_CORE
CT a/p 6/18:  Dilated small bowel loops with multiple transition points an apparent   luminal filling defects. Further evaluation is recommended. CT a/p 6/18:  Dilated small bowel loops with multiple transition points an apparent   luminal filling defects. Further evaluation is recommended.    CXR 6/18:  IMPRESSION: Clear lungs.    ABXR 6/18:  IMPRESSION: Nonobstructive bowel gas pattern. If further evaluation for   pneumoperitoneum is needed a lateral decubitus film may be performed.     Follow-up ordered CT abdomen and pelvis.    EKG: sinus charmaine @59

## 2017-06-19 NOTE — CONSULT NOTE ADULT - ASSESSMENT
ASSESSMENT: Patient is a 62y old f with abdominal pain, likely 2/2 SBO with multiple transition points    PLAN:    - No acute surgical intervention  - Serial abdominal exams  - Trend WBC and lactate  - If patient has worsening abdominal pain or increasing pain medicine requirements, or worsening nausea and/or emesis, patient will need an NGT placed for decompression.  Discussed this with patient, she is agreeable if needed.    - Plan discussed with Attending, Dr. Clark ASSESSMENT: Patient is a 62y old f with abdominal pain, likely 2/2 SBO with multiple transition points    PLAN:    - No acute surgical intervention  - Serial abdominal exams  - NPO, f/u GI function.  - Trend WBC and lactate  - If patient has worsening abdominal pain or increasing pain medicine requirements, or worsening nausea and/or emesis, patient will need an NGT placed for decompression.  Discussed this with patient, she is agreeable if needed.    - Plan discussed with Attending, Dr. Clark

## 2017-06-19 NOTE — H&P ADULT - HISTORY OF PRESENT ILLNESS
61 yo F w/PMHx GERD, HTN, asthma (well-controlled), hx spontaneously-resolving SBO and multiple abdominal surgeries p/w dull, non-radiating LLQ abdominal pain x 1 day. Pt had it yesterday AM and normal, formed BM at that time. She took Pepto-Bismol and aspirin, but after symptoms did not resolve, on recommendation of her PMD Dr. Willard, pt presented to ER for more acute medical care. Denies F/C, dizziness, nausea/vomiting, association with food intake, melena/hematochezia, dysuria, recent diet changes, medication changes, travel. Previous  EGD/colonoscopy on 5/13/16 normal aside from hemorrhoids noted during colonoscopy.      In the ED, pt vitals were:  T= 36.5 HR= 71 BP= 162/77 RR= 20 O2SAT= 98% RA  There, she was given a bolus of NS and several courses of morphine with improvement of symptoms. CT scan of the abdomen showed dilated small bowel loops with multiple transition points and SB luminal filling defects. 61 yo F w/PMHx GERD, HTN, asthma (well-controlled), hx spontaneously-resolving SBO and multiple abdominal surgeries p/w dull, non-radiating LLQ abdominal pain x 1 day. Pt started experiencing progressive pain in her abdomen yesterday AM and had a normal, formed BM at that time. She had mild nausea at that point but no vomiting. Pt took Pepto-Bismol and aspirin, but after symptoms did not resolve, on recommendation of her PMD Dr. Willard, pt presented to ER for further medical evaluation. Denies F/C, dizziness, vomiting, association with food intake, melena/hematochezia, dysuria, recent diet changes, medication changes, travel. Previous  EGD/colonoscopy on 5/13/16 normal aside from hemorrhoids noted during colonoscopy.      In the ED, pt vitals were:  T= 36.5 HR= 71 BP= 162/77 RR= 20 O2SAT= 98% RA  There, she was given a bolus of NS and several courses of morphine with improvement of symptoms. CT scan of the abdomen showed dilated small bowel loops with multiple transition points and SB luminal filling defects.

## 2017-06-19 NOTE — H&P ADULT - PSH
Delivery by (planned)  section occurring after 37 completed weeks of gestation but before 39 completed weeks gestation due to (spontaneous) onset of labor, with mention of postpartum complication    H/O myomectomy

## 2017-06-19 NOTE — H&P ADULT - NSHPREVIEWOFSYSTEMS_GEN_ALL_CORE
Denies headaches, F/C, dizziness, syncope, CP/SOB, N/V, abdominal pain, dysuria, changes in BM, peripheral swelling, skin changes, new joint aches. Tolerating PO, ambulatory. Denies headaches, F/C, dizziness, syncope, CP/SOB, N/V, dysuria, changes in BM, peripheral swelling, skin changes, new joint aches.

## 2017-06-19 NOTE — H&P ADULT - ASSESSMENT
63 yo F w/PMHx GERD, HTN, asthma (well-controlled), hx spontaneously-resolving SBO and multiple abdominal surgeries p/w dull, non-radiating LLQ abdominal pain x 1 day; CT scan shows dilated loops of bowel - likely SBO 2/2 adhesions from multiple abdominal surgeries.

## 2017-06-19 NOTE — H&P ADULT - NSHPSOCIALHISTORY_GEN_ALL_CORE
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 social alcohol use; CAGE negative.

## 2017-06-19 NOTE — H&P ADULT - PMH
Asthma    Gastroesophageal reflux disease, esophagitis presence not specified    History of  Section, Unknown Scar    History of Myomectomy    HTN (hypertension)

## 2017-06-19 NOTE — H&P ADULT - NSHPPHYSICALEXAM_GEN_ALL_CORE
On physical exam:  Gen: NAD overweight female  ENT:  Chest/CV: RRR, +2 peripheral pulses  Pulm: CTAB/L  Abd: soft, NT, ND +BS  MSK: no peripheral edema/cyanosis   Skin: no rashes On physical exam:  Gen: NAD overweight female  ENT: PERRLA, EOMI  Neck: soft, supple, no thyromegaly  Chest/CV: RRR, +2 peripheral pulses  Pulm: CTAB/L  Abd/GI: soft, distended, BSX4, mild tenderness to palpation LLQ and LUQ, no rebound, no guarding, no tympany to percussion  MSK: no peripheral edema/cyanosis   Skin: no rashes  Heme/lymph: no ecchymoses, no cervical lymphadenopathy  Neuro: CN 2-12 grossly intact  Psych: alert and oriented, normal mood and affect Vital Signs Last 24 Hrs  T(C): 36.4, Max: 36.7 (06-18 @ 17:52)  T(F): 97.6, Max: 98.1 (06-18 @ 17:52)  HR: 65 (59 - 69)  BP: 124/70 (109/65 - 166/81)  BP(mean): --  RR: 18 (16 - 18)  SpO2: 96% (95% - 100%)    On physical exam:  Gen: NAD overweight female  ENT: PERRLA, EOMI  Neck: soft, supple, no thyromegaly  Chest/CV: RRR, +2 peripheral pulses  Pulm: CTAB/L  Abd/GI: soft, distended, BSX4, mild tenderness to palpation LLQ and LUQ, no rebound, no guarding, no tympany to percussion  MSK: no peripheral edema/cyanosis   Skin: no rashes  Heme/lymph: no ecchymoses, no cervical lymphadenopathy  Neuro: CN 2-12 grossly intact  Psych: alert and oriented, normal mood and affect

## 2017-06-20 DIAGNOSIS — K56.69 OTHER INTESTINAL OBSTRUCTION: ICD-10-CM

## 2017-06-20 LAB
ANION GAP SERPL CALC-SCNC: 13 MMOL/L — SIGNIFICANT CHANGE UP (ref 5–17)
ANION GAP SERPL CALC-SCNC: 14 MMOL/L — SIGNIFICANT CHANGE UP (ref 5–17)
BUN SERPL-MCNC: 7 MG/DL — SIGNIFICANT CHANGE UP (ref 7–23)
BUN SERPL-MCNC: 9 MG/DL — SIGNIFICANT CHANGE UP (ref 7–23)
CALCIUM SERPL-MCNC: 8 MG/DL — LOW (ref 8.4–10.5)
CALCIUM SERPL-MCNC: 9.1 MG/DL — SIGNIFICANT CHANGE UP (ref 8.4–10.5)
CHLORIDE SERPL-SCNC: 103 MMOL/L — SIGNIFICANT CHANGE UP (ref 96–108)
CHLORIDE SERPL-SCNC: 105 MMOL/L — SIGNIFICANT CHANGE UP (ref 96–108)
CO2 SERPL-SCNC: 25 MMOL/L — SIGNIFICANT CHANGE UP (ref 22–31)
CO2 SERPL-SCNC: 26 MMOL/L — SIGNIFICANT CHANGE UP (ref 22–31)
CREAT SERPL-MCNC: 0.71 MG/DL — SIGNIFICANT CHANGE UP (ref 0.5–1.3)
CREAT SERPL-MCNC: 0.81 MG/DL — SIGNIFICANT CHANGE UP (ref 0.5–1.3)
GLUCOSE SERPL-MCNC: 92 MG/DL — SIGNIFICANT CHANGE UP (ref 70–99)
GLUCOSE SERPL-MCNC: 95 MG/DL — SIGNIFICANT CHANGE UP (ref 70–99)
HCT VFR BLD CALC: 33.8 % — LOW (ref 34.5–45)
HGB BLD-MCNC: 11.1 G/DL — LOW (ref 11.5–15.5)
LACTATE SERPL-SCNC: 0.9 MMOL/L — SIGNIFICANT CHANGE UP (ref 0.7–2)
MAGNESIUM SERPL-MCNC: 1.8 MG/DL — SIGNIFICANT CHANGE UP (ref 1.6–2.6)
MCHC RBC-ENTMCNC: 28.6 PG — SIGNIFICANT CHANGE UP (ref 27–34)
MCHC RBC-ENTMCNC: 32.8 GM/DL — SIGNIFICANT CHANGE UP (ref 32–36)
MCV RBC AUTO: 87.1 FL — SIGNIFICANT CHANGE UP (ref 80–100)
PHOSPHATE SERPL-MCNC: 2.3 MG/DL — LOW (ref 2.5–4.5)
PLATELET # BLD AUTO: 205 K/UL — SIGNIFICANT CHANGE UP (ref 150–400)
POTASSIUM SERPL-MCNC: 3.1 MMOL/L — LOW (ref 3.5–5.3)
POTASSIUM SERPL-MCNC: 3.3 MMOL/L — LOW (ref 3.5–5.3)
POTASSIUM SERPL-SCNC: 3.1 MMOL/L — LOW (ref 3.5–5.3)
POTASSIUM SERPL-SCNC: 3.3 MMOL/L — LOW (ref 3.5–5.3)
RBC # BLD: 3.88 M/UL — SIGNIFICANT CHANGE UP (ref 3.8–5.2)
RBC # FLD: 13.6 % — SIGNIFICANT CHANGE UP (ref 10.3–14.5)
SODIUM SERPL-SCNC: 143 MMOL/L — SIGNIFICANT CHANGE UP (ref 135–145)
SODIUM SERPL-SCNC: 143 MMOL/L — SIGNIFICANT CHANGE UP (ref 135–145)
WBC # BLD: 6.7 K/UL — SIGNIFICANT CHANGE UP (ref 3.8–10.5)
WBC # FLD AUTO: 6.7 K/UL — SIGNIFICANT CHANGE UP (ref 3.8–10.5)

## 2017-06-20 PROCEDURE — 74000: CPT | Mod: 26

## 2017-06-20 PROCEDURE — 99233 SBSQ HOSP IP/OBS HIGH 50: CPT | Mod: GC

## 2017-06-20 RX ORDER — IPRATROPIUM/ALBUTEROL SULFATE 18-103MCG
3 AEROSOL WITH ADAPTER (GRAM) INHALATION ONCE
Qty: 0 | Refills: 0 | Status: COMPLETED | OUTPATIENT
Start: 2017-06-20 | End: 2017-06-20

## 2017-06-20 RX ORDER — POTASSIUM PHOSPHATE, MONOBASIC POTASSIUM PHOSPHATE, DIBASIC 236; 224 MG/ML; MG/ML
15 INJECTION, SOLUTION INTRAVENOUS ONCE
Qty: 0 | Refills: 0 | Status: COMPLETED | OUTPATIENT
Start: 2017-06-20 | End: 2017-06-20

## 2017-06-20 RX ORDER — IPRATROPIUM/ALBUTEROL SULFATE 18-103MCG
3 AEROSOL WITH ADAPTER (GRAM) INHALATION EVERY 6 HOURS
Qty: 0 | Refills: 0 | Status: DISCONTINUED | OUTPATIENT
Start: 2017-06-20 | End: 2017-06-22

## 2017-06-20 RX ORDER — METOPROLOL TARTRATE 50 MG
5 TABLET ORAL EVERY 6 HOURS
Qty: 0 | Refills: 0 | Status: DISCONTINUED | OUTPATIENT
Start: 2017-06-20 | End: 2017-06-22

## 2017-06-20 RX ORDER — ALBUTEROL 90 UG/1
1 AEROSOL, METERED ORAL EVERY 4 HOURS
Qty: 0 | Refills: 0 | Status: DISCONTINUED | OUTPATIENT
Start: 2017-06-20 | End: 2017-06-20

## 2017-06-20 RX ORDER — SODIUM CHLORIDE 9 MG/ML
1000 INJECTION, SOLUTION INTRAVENOUS
Qty: 0 | Refills: 0 | Status: DISCONTINUED | OUTPATIENT
Start: 2017-06-20 | End: 2017-06-21

## 2017-06-20 RX ORDER — MAGNESIUM SULFATE 500 MG/ML
2 VIAL (ML) INJECTION ONCE
Qty: 0 | Refills: 0 | Status: COMPLETED | OUTPATIENT
Start: 2017-06-20 | End: 2017-06-20

## 2017-06-20 RX ORDER — POTASSIUM CHLORIDE 20 MEQ
10 PACKET (EA) ORAL
Qty: 0 | Refills: 0 | Status: COMPLETED | OUTPATIENT
Start: 2017-06-20 | End: 2017-06-20

## 2017-06-20 RX ADMIN — ENOXAPARIN SODIUM 40 MILLIGRAM(S): 100 INJECTION SUBCUTANEOUS at 13:00

## 2017-06-20 RX ADMIN — Medication 25 MILLIGRAM(S): at 05:09

## 2017-06-20 RX ADMIN — Medication 3 MILLILITER(S): at 04:06

## 2017-06-20 RX ADMIN — Medication 100 MILLIEQUIVALENT(S): at 13:00

## 2017-06-20 RX ADMIN — Medication 5 MILLIGRAM(S): at 23:20

## 2017-06-20 RX ADMIN — Medication 100 MILLIEQUIVALENT(S): at 11:12

## 2017-06-20 RX ADMIN — Medication 100 MILLIEQUIVALENT(S): at 14:42

## 2017-06-20 RX ADMIN — SODIUM CHLORIDE 100 MILLILITER(S): 9 INJECTION, SOLUTION INTRAVENOUS at 11:12

## 2017-06-20 RX ADMIN — Medication 50 GRAM(S): at 16:44

## 2017-06-20 RX ADMIN — POTASSIUM PHOSPHATE, MONOBASIC POTASSIUM PHOSPHATE, DIBASIC 62.5 MILLIMOLE(S): 236; 224 INJECTION, SOLUTION INTRAVENOUS at 18:32

## 2017-06-20 RX ADMIN — SODIUM CHLORIDE 100 MILLILITER(S): 9 INJECTION, SOLUTION INTRAVENOUS at 23:24

## 2017-06-20 RX ADMIN — FAMOTIDINE 20 MILLIGRAM(S): 10 INJECTION INTRAVENOUS at 05:09

## 2017-06-20 NOTE — PROGRESS NOTE ADULT - PROBLEM SELECTOR PLAN 1
-NPO  -Monitor GI function  -Patient should not have PRN pain medications. If she is in pain then she should have an abdominal exam prior to prescribing a one time dose of pain medication to monitor her symptoms. If she has increasing pain or distention the surgery team should be notified (pager [phone]). Overnight medicine team 1 resident was notified.    -If patient has worsening pain, increasing pain med requirements, worsening nausea, or emesis then she needs an NGT for decompression. This was discussed with the patient.  -Will discuss with attending

## 2017-06-20 NOTE — PROGRESS NOTE ADULT - PROBLEM SELECTOR PLAN 1
-pain control as needed  -maintain NPO  -c/w fluids  -NGT if emesis  -as per surgery non-remitting pain may be indicative of bowel necrosis - will transfer to surgery service as per Dr. Clark

## 2017-06-20 NOTE — PROGRESS NOTE ADULT - SUBJECTIVE AND OBJECTIVE BOX
24 HOUR EVENTS/ROS: No acute events overnight. Pt reports continued 7/10 LLQ ab pain, resolved somewhat with oxycodone 5. Denies headaches, F/C, dizziness, syncope, CP/SOB, vomiting, dysuria, peripheral swelling, skin changes, new joint aches. Pt did see bright red blood    MEDICATIONS:  metoprolol 25milliGRAM(s) Oral two times a day  enoxaparin Injectable 40milliGRAM(s) SubCutaneous daily  hydrochlorothiazide    Capsule 12.5milliGRAM(s) Oral daily  ALBUTerol/ipratropium for Nebulization 3milliLiter(s) Nebulizer every 6 hours PRN  ALBUTerol    90 MICROgram(s) HFA Inhaler 1Puff(s) Inhalation every 4 hours PRN  ondansetron Injectable 4milliGRAM(s) IV Push every 4 hours PRN  sodium chloride 0.9%. 1000milliLiter(s) IV Continuous <Continuous>  potassium chloride  10 mEq/100 mL IVPB 10milliEquivalent(s) IV Intermittent every 1 hour  potassium phosphate IVPB 15milliMole(s) IV Intermittent once      PHYSICAL EXAM:  T(C): 37, Max: 37 (06-20 @ 09:56)  HR: 75 (64 - 75)  BP: 148/82 (104/63 - 148/82)  RR: 18 (16 - 18)  SpO2: 97% (95% - 97%)  Wt(kg): --  Daily Height in cm: 152.4 (19 Jun 2017 16:01)    Daily   I&O's Summary    I & Os for current day (as of 20 Jun 2017 10:19)  =============================================  IN: 900 ml / OUT: 0 ml / NET: 900 ml    TELEMETRY:     Appearance: NAD	  HEENT:   Normal oral mucosa, PERRL, EOMI	  Lymphatic: No lymphadenopathy  Cardiovascular: Normal S1 S2, No JVD, No murmurs, No edema  Respiratory: Lungs clear to auscultation	  Psychiatry: A & O x 3, Mood & affect appropriate  Gastrointestinal:  Soft, Non-tender, + BS	  Skin: No rashes, No ecchymoses, No cyanosis	  Neurologic: Non-focal  Extremities: Normal range of motion, No clubbing, cyanosis or edema  Vascular: Peripheral pulses palpable 2+ bilaterally    LABS:	 	                        11.1   6.70  )-----------( 205      ( 20 Jun 2017 07:15 )             33.8     06-20    143  |  105  |  9   ----------------------------<  92  3.1<L>   |  25  |  0.81  06-18    143  |  101  |  19  ----------------------------<  99  3.8   |  26  |  0.70    Ca    8.0<L>      20 Jun 2017 07:19  Ca    10.0      18 Jun 2017 18:09  Phos  2.3     06-20  Mg     1.8     06-20    TPro  6.8  /  Alb  4.1  /  TBili  0.3  /  DBili  x   /  AST  21  /  ALT  32  /  AlkPhos  62  06-18      proBNP:   Lipid Profile:   HgA1c:   TSH:   FS: CAPILLARY BLOOD GLUCOSE    BCX/UCX: .Urine Clean Catch (Midstream)  06-18-17   No growth  --  --            CARDIAC MARKERS:            	    ECG:  	  RADIOLOGY: TRANSFER NOTE:    24 HOUR EVENTS/ROS: No acute events overnight. Pt reports continued 7/10 LLQ ab pain, resolved somewhat with oxycodone 5. Denies headaches, F/C, dizziness, syncope, CP/SOB, vomiting, dysuria, peripheral swelling, skin changes, new joint aches. Pt did see bright red blood on tissue after wiping; denies melena.    MEDICATIONS:  metoprolol 25milliGRAM(s) Oral two times a day  enoxaparin Injectable 40milliGRAM(s) SubCutaneous daily  hydrochlorothiazide    Capsule 12.5milliGRAM(s) Oral daily  ALBUTerol/ipratropium for Nebulization 3milliLiter(s) Nebulizer every 6 hours PRN  ALBUTerol    90 MICROgram(s) HFA Inhaler 1Puff(s) Inhalation every 4 hours PRN  ondansetron Injectable 4milliGRAM(s) IV Push every 4 hours PRN  sodium chloride 0.9%. 1000milliLiter(s) IV Continuous <Continuous>  potassium chloride  10 mEq/100 mL IVPB 10milliEquivalent(s) IV Intermittent every 1 hour  potassium phosphate IVPB 15milliMole(s) IV Intermittent once      PHYSICAL EXAM:  T(C): 37, Max: 37 (06-20 @ 09:56)  HR: 75 (64 - 75)  BP: 148/82 (104/63 - 148/82)  RR: 18 (16 - 18)  SpO2: 97% (95% - 97%)  Wt(kg): --  Daily Height in cm: 152.4 (19 Jun 2017 16:01)    Daily   I&O's Summary    I & Os for current day (as of 20 Jun 2017 10:19)  =============================================  IN: 900 ml / OUT: 0 ml / NET: 900 ml    TELEMETRY:     Appearance: NAD	  HEENT:   Normal oral mucosa, PERRL, EOMI	  Lymphatic: No lymphadenopathy  Cardiovascular: Normal S1 S2, No JVD, No murmurs, No edema  Respiratory: Lungs clear to auscultation	  Psychiatry: A & O x 3, Mood & affect appropriate  Gastrointestinal:  Soft, Non-tender, + BS	  Skin: No rashes, No ecchymoses, No cyanosis	  Neurologic: Non-focal  Extremities: Normal range of motion, No clubbing, cyanosis or edema  Vascular: Peripheral pulses palpable 2+ bilaterally    LABS:	 	                        11.1   6.70  )-----------( 205      ( 20 Jun 2017 07:15 )             33.8     06-20    143  |  105  |  9   ----------------------------<  92  3.1<L>   |  25  |  0.81  06-18    143  |  101  |  19  ----------------------------<  99  3.8   |  26  |  0.70    Ca    8.0<L>      20 Jun 2017 07:19  Ca    10.0      18 Jun 2017 18:09  Phos  2.3     06-20  Mg     1.8     06-20    TPro  6.8  /  Alb  4.1  /  TBili  0.3  /  DBili  x   /  AST  21  /  ALT  32  /  AlkPhos  62  06-18      BCX/UCX: .Urine Clean Catch (Midstream)  06-18-17   No growth  --  -- TRANSFER NOTE:    24 HOUR EVENTS/ROS: No acute events overnight. Pt reports continued 7/10 LLQ ab pain, resolved somewhat with oxycodone 5. Denies headaches, F/C, dizziness, syncope, CP/SOB, vomiting, dysuria, peripheral swelling, skin changes, new joint aches. Pt did see bright red blood on tissue after wiping; denies melena. No BM, but she passed flatus x 2. Still NPO.    MEDICATIONS:  metoprolol 25milliGRAM(s) Oral two times a day  enoxaparin Injectable 40milliGRAM(s) SubCutaneous daily  hydrochlorothiazide    Capsule 12.5milliGRAM(s) Oral daily  ALBUTerol/ipratropium for Nebulization 3milliLiter(s) Nebulizer every 6 hours PRN  ALBUTerol    90 MICROgram(s) HFA Inhaler 1Puff(s) Inhalation every 4 hours PRN  ondansetron Injectable 4milliGRAM(s) IV Push every 4 hours PRN  sodium chloride 0.9%. 1000milliLiter(s) IV Continuous <Continuous>  potassium chloride  10 mEq/100 mL IVPB 10milliEquivalent(s) IV Intermittent every 1 hour  potassium phosphate IVPB 15milliMole(s) IV Intermittent once      PHYSICAL EXAM:  T(C): 37, Max: 37 (06-20 @ 09:56)  HR: 75 (64 - 75)  BP: 148/82 (104/63 - 148/82)  RR: 18 (16 - 18)  SpO2: 97% (95% - 97%)  Wt(kg): --  Daily Height in cm: 152.4 (19 Jun 2017 16:01)    Daily   I&O's Summary    I & Os for current day (as of 20 Jun 2017 10:19)  =============================================  IN: 900 ml / OUT: 0 ml / NET: 900 ml    Appearance: NAD	  HEENT:   Normal oral mucosa, PERRL, EOMI	  Lymphatic: No lymphadenopathy  Cardiovascular: Normal S1 S2, No JVD, No murmurs, No edema  Respiratory: Lungs clear to auscultation	  Psychiatry: A & O x 3, Mood & affect appropriate  Gastrointestinal:  Soft, Non-tender, + BS	  Skin: No rashes, No ecchymoses, No cyanosis	  Neurologic: Non-focal  Extremities: Normal range of motion, No clubbing, cyanosis or edema  Vascular: Peripheral pulses palpable 2+ bilaterally    LABS:	 	                        11.1   6.70  )-----------( 205      ( 20 Jun 2017 07:15 )             33.8     06-20    143  |  105  |  9   ----------------------------<  92  3.1<L>   |  25  |  0.81  06-18    143  |  101  |  19  ----------------------------<  99  3.8   |  26  |  0.70    Ca    8.0<L>      20 Jun 2017 07:19  Ca    10.0      18 Jun 2017 18:09  Phos  2.3     06-20  Mg     1.8     06-20    TPro  6.8  /  Alb  4.1  /  TBili  0.3  /  DBili  x   /  AST  21  /  ALT  32  /  AlkPhos  62  06-18      BCX/UCX: .Urine Clean Catch (Midstream)  06-18-17   No growth  --  -- TRANSFER NOTE:    24 HOUR EVENTS/ROS: No acute events overnight. Pt reports continued 7/10 LLQ ab pain, resolved somewhat with oxycodone 5. Denies headaches, F/C, dizziness, syncope, CP/SOB, vomiting, dysuria, peripheral swelling, skin changes, new joint aches. Pt did see bright red blood on tissue after wiping; denies melena. No BM, but she passed flatus x 2. Still NPO.    MEDICATIONS:  metoprolol 25milliGRAM(s) Oral two times a day  enoxaparin Injectable 40milliGRAM(s) SubCutaneous daily  hydrochlorothiazide    Capsule 12.5milliGRAM(s) Oral daily  ALBUTerol/ipratropium for Nebulization 3milliLiter(s) Nebulizer every 6 hours PRN  ALBUTerol    90 MICROgram(s) HFA Inhaler 1Puff(s) Inhalation every 4 hours PRN  ondansetron Injectable 4milliGRAM(s) IV Push every 4 hours PRN  sodium chloride 0.9%. 1000milliLiter(s) IV Continuous <Continuous>  potassium chloride  10 mEq/100 mL IVPB 10milliEquivalent(s) IV Intermittent every 1 hour  potassium phosphate IVPB 15milliMole(s) IV Intermittent once      PHYSICAL EXAM:  T(C): 37, Max: 37 (06-20 @ 09:56)  HR: 75 (64 - 75)  BP: 148/82 (104/63 - 148/82)  RR: 18 (16 - 18)  SpO2: 97% (95% - 97%)  Wt(kg): --  Daily Height in cm: 152.4 (19 Jun 2017 16:01)    Daily   I&O's Summary    I & Os for current day (as of 20 Jun 2017 10:19)  =============================================  IN: 900 ml / OUT: 0 ml / NET: 900 ml    Appearance: NAD female talking on phone	  HEENT:   Normal oral mucosa, PERRL, EOMI	  Lymphatic: No lymphadenopathy  Cardiovascular: Normal S1 S2, No JVD, No murmurs, No edema  Respiratory: Lungs clear to auscultation	  Psychiatry: A & O x 3, Mood & affect appropriate  Gastrointestinal:  Soft, mildly distended, mild tympany to percussion, tender to palpation on LLQ and LUQ, no rebound, no guarding  Skin: No rashes, No ecchymoses, No cyanosis	  Neurologic: Non-focal  Extremities: Normal range of motion, No clubbing, cyanosis or edema  Vascular: Peripheral pulses palpable 2+ bilaterally    LABS:	 	                        11.1   6.70  )-----------( 205      ( 20 Jun 2017 07:15 )             33.8     06-20    143  |  105  |  9   ----------------------------<  92  3.1<L>   |  25  |  0.81  06-18    143  |  101  |  19  ----------------------------<  99  3.8   |  26  |  0.70    Ca    8.0<L>      20 Jun 2017 07:19  Ca    10.0      18 Jun 2017 18:09  Phos  2.3     06-20  Mg     1.8     06-20    TPro  6.8  /  Alb  4.1  /  TBili  0.3  /  DBili  x   /  AST  21  /  ALT  32  /  AlkPhos  62  06-18      BCX/UCX: .Urine Clean Catch (Midstream)  06-18-17   No growth  --  --

## 2017-06-20 NOTE — PROGRESS NOTE ADULT - SUBJECTIVE AND OBJECTIVE BOX
GENERAL SURGERY DAILY PROGRESS NOTE:     Subjective: Pt reports abdominal pain has improved, but is still present. She passed flatus once last night. No BM. She reports minimal nausea, no emesis.     Objective:  Vital Signs Last 24 Hrs  T(C): 36.6, Max: 36.6 ( @ 07:30)  T(F): 97.8, Max: 97.8 ( @ 07:30)  HR: 65 (64 - 69)  BP: 104/63 (104/63 - 139/68)  BP(mean): --  RR: 18 (16 - 18)  SpO2: 95% (95% - 96%)    I&O's Detail    I & Os for current day (as of 2017 06:51)  =============================================  IN:    sodium chloride 0.9%.: 900 ml    Total IN: 900 ml  ---------------------------------------------  OUT:    Total OUT: 0 ml  ---------------------------------------------  Total NET: 900 ml      Physical:  General: NAD  Abdomen: soft, ND, minimally tender in LLQ and RLQ. no HSM. no palpable masses.     MEDICATIONS  (STANDING):  sodium chloride 0.9%. 1000milliLiter(s) IV Continuous <Continuous>  metoprolol 25milliGRAM(s) Oral two times a day  enoxaparin Injectable 40milliGRAM(s) SubCutaneous daily  famotidine    Tablet 20milliGRAM(s) Oral two times a day  hydrochlorothiazide    Capsule 12.5milliGRAM(s) Oral daily    MEDICATIONS  (PRN):  ondansetron Injectable 4milliGRAM(s) IV Push every 4 hours PRN Nausea and/or Vomiting  morphine  - Injectable 2milliGRAM(s) IV Push every 8 hours PRN Severe Pain (7 - 10)  acetaminophen  IVPB. 1000milliGRAM(s) IV Intermittent once PRN Moderate Pain (4 - 6)  oxyCODONE IR 5milliGRAM(s) Oral every 6 hours PRN Moderate Pain (4 - 6)      LABS:                        13.0   10.5  )-----------( 230      ( 2017 18:09 )             37.0     06-18    143  |  101  |  19  ----------------------------<  99  3.8   |  26  |  0.70    Ca    10.0      2017 18:09    TPro  6.8  /  Alb  4.1  /  TBili  0.3  /  DBili  x   /  AST  21  /  ALT  32  /  AlkPhos  62  06-18    PT/INR - ( 2017 18:09 )   PT: 11.2 sec;   INR: 1.03 ratio         PTT - ( 2017 18:09 )  PTT:32.3 sec  Urinalysis Basic - ( 2017 18:09 )    Color: Yellow / Appearance: Clear / S.025 / pH: x  Gluc: x / Ketone: Negative  / Bili: Negative / Urobili: Negative   Blood: x / Protein: 30 mg/dL / Nitrite: Negative   Leuk Esterase: Small / RBC: 3-5 /HPF / WBC 3-5 /HPF   Sq Epi: x / Non Sq Epi: OCC /HPF / Bacteria: Few /HPF

## 2017-06-20 NOTE — PROGRESS NOTE ADULT - SUBJECTIVE AND OBJECTIVE BOX
SUBJECTIVE:  Worsening abdominal pain overnight requiring pain meds.  +flatus.  _____________________________________________________  OBJECTIVE:    T(C): 37, Max: 37 (06-20 @ 09:56)  HR: 60  BP: 106/63  RR: 18  SpO2: 97%  Wt(kg): --    I & Os for current day (as of 06-20 @ 15:42)  =============================================  IN:    sodium chloride 0.9%: 900 ml    Total IN: 900 ml  ---------------------------------------------  OUT:    Total OUT: 0 ml  ---------------------------------------------  Total NET: 900 ml    PHYSICAL EXAM:      Constitutional: mild distress due to pain    Respiratory: CTA anteriorly    Cardiovascular: RR    Gastrointestinal: mildly distended, diffusely tender, +BS    Extremities: no c/c    _____________________________________________________  LABS:                        11.1   6.70  )-----------( 205      ( 20 Jun 2017 07:15 )             33.8     06-20    143  |  105  |  9   ----------------------------<  92  3.1<L>   |  25  |  0.81    Ca    8.0<L>      20 Jun 2017 07:19  Phos  2.3     06-20  Mg     1.8     06-20    TPro  6.8  /  Alb  4.1  /  TBili  0.3  /  DBili  x   /  AST  21  /  ALT  32  /  AlkPhos  62  06-18    LIVER FUNCTIONS - ( 18 Jun 2017 18:09 )  Alb: 4.1 g/dL / Pro: 6.8 g/dL / ALK PHOS: 62 U/L / ALT: 32 U/L RC / AST: 21 U/L / GGT: x           AXR: +contrast in the colon, no AFL    _____________________________________________________  ACTIVE MEDS:  MEDICATIONS  (STANDING):  enoxaparin Injectable 40milliGRAM(s) SubCutaneous daily  potassium phosphate IVPB 15milliMole(s) IV Intermittent once  lactated ringers. 1000milliLiter(s) IV Continuous <Continuous>  metoprolol Injectable 5milliGRAM(s) IV Push every 6 hours  magnesium sulfate  IVPB 2Gram(s) IV Intermittent once    MEDICATIONS  (PRN):  ALBUTerol/ipratropium for Nebulization 3milliLiter(s) Nebulizer every 6 hours PRN Shortness of Breath and/or Wheezing    _____________________________________________________  ASSESSMENT:  62yFemale with SBO, ?etiology.  Case reviewed with Dr. Clark who will accept patient to surgical service for monitoring    PLAN:  1.  NPO, serial abdominal exams  2.  NGT placement unsuccessful but as AXR shows progression of contrast decision is to hold off and observe clinically  3.  If continues to resolve clinically needs outpatient enterography re: ?small bowel lesions

## 2017-06-21 LAB
ANION GAP SERPL CALC-SCNC: 14 MMOL/L — SIGNIFICANT CHANGE UP (ref 5–17)
BUN SERPL-MCNC: 6 MG/DL — LOW (ref 7–23)
CALCIUM SERPL-MCNC: 8.9 MG/DL — SIGNIFICANT CHANGE UP (ref 8.4–10.5)
CHLORIDE SERPL-SCNC: 102 MMOL/L — SIGNIFICANT CHANGE UP (ref 96–108)
CO2 SERPL-SCNC: 26 MMOL/L — SIGNIFICANT CHANGE UP (ref 22–31)
CREAT SERPL-MCNC: 0.63 MG/DL — SIGNIFICANT CHANGE UP (ref 0.5–1.3)
GLUCOSE SERPL-MCNC: 88 MG/DL — SIGNIFICANT CHANGE UP (ref 70–99)
HCT VFR BLD CALC: 36.2 % — SIGNIFICANT CHANGE UP (ref 34.5–45)
HGB BLD-MCNC: 12.3 G/DL — SIGNIFICANT CHANGE UP (ref 11.5–15.5)
MAGNESIUM SERPL-MCNC: 2.1 MG/DL — SIGNIFICANT CHANGE UP (ref 1.6–2.6)
MCHC RBC-ENTMCNC: 29.4 PG — SIGNIFICANT CHANGE UP (ref 27–34)
MCHC RBC-ENTMCNC: 34 GM/DL — SIGNIFICANT CHANGE UP (ref 32–36)
MCV RBC AUTO: 86.4 FL — SIGNIFICANT CHANGE UP (ref 80–100)
PHOSPHATE SERPL-MCNC: 3 MG/DL — SIGNIFICANT CHANGE UP (ref 2.5–4.5)
PLATELET # BLD AUTO: 242 K/UL — SIGNIFICANT CHANGE UP (ref 150–400)
POTASSIUM SERPL-MCNC: 3.3 MMOL/L — LOW (ref 3.5–5.3)
POTASSIUM SERPL-SCNC: 3.3 MMOL/L — LOW (ref 3.5–5.3)
RBC # BLD: 4.19 M/UL — SIGNIFICANT CHANGE UP (ref 3.8–5.2)
RBC # FLD: 13.5 % — SIGNIFICANT CHANGE UP (ref 10.3–14.5)
SODIUM SERPL-SCNC: 142 MMOL/L — SIGNIFICANT CHANGE UP (ref 135–145)
WBC # BLD: 8.31 K/UL — SIGNIFICANT CHANGE UP (ref 3.8–10.5)
WBC # FLD AUTO: 8.31 K/UL — SIGNIFICANT CHANGE UP (ref 3.8–10.5)

## 2017-06-21 RX ORDER — FAMOTIDINE 10 MG/ML
20 INJECTION INTRAVENOUS DAILY
Qty: 0 | Refills: 0 | Status: DISCONTINUED | OUTPATIENT
Start: 2017-06-21 | End: 2017-06-22

## 2017-06-21 RX ORDER — DEXTROSE MONOHYDRATE, SODIUM CHLORIDE, AND POTASSIUM CHLORIDE 50; .745; 4.5 G/1000ML; G/1000ML; G/1000ML
1000 INJECTION, SOLUTION INTRAVENOUS
Qty: 0 | Refills: 0 | Status: DISCONTINUED | OUTPATIENT
Start: 2017-06-21 | End: 2017-06-22

## 2017-06-21 RX ORDER — POTASSIUM CHLORIDE 20 MEQ
10 PACKET (EA) ORAL ONCE
Qty: 0 | Refills: 0 | Status: COMPLETED | OUTPATIENT
Start: 2017-06-21 | End: 2017-06-21

## 2017-06-21 RX ADMIN — Medication 5 MILLIGRAM(S): at 17:19

## 2017-06-21 RX ADMIN — ENOXAPARIN SODIUM 40 MILLIGRAM(S): 100 INJECTION SUBCUTANEOUS at 11:02

## 2017-06-21 RX ADMIN — Medication 1 ENEMA: at 09:35

## 2017-06-21 RX ADMIN — Medication 100 MILLIEQUIVALENT(S): at 11:38

## 2017-06-21 RX ADMIN — DEXTROSE MONOHYDRATE, SODIUM CHLORIDE, AND POTASSIUM CHLORIDE 30 MILLILITER(S): 50; .745; 4.5 INJECTION, SOLUTION INTRAVENOUS at 23:46

## 2017-06-21 RX ADMIN — Medication 5 MILLIGRAM(S): at 23:46

## 2017-06-21 RX ADMIN — Medication 5 MILLIGRAM(S): at 05:07

## 2017-06-21 RX ADMIN — Medication 5 MILLIGRAM(S): at 11:05

## 2017-06-21 RX ADMIN — DEXTROSE MONOHYDRATE, SODIUM CHLORIDE, AND POTASSIUM CHLORIDE 30 MILLILITER(S): 50; .745; 4.5 INJECTION, SOLUTION INTRAVENOUS at 10:59

## 2017-06-21 RX ADMIN — FAMOTIDINE 20 MILLIGRAM(S): 10 INJECTION INTRAVENOUS at 11:36

## 2017-06-21 NOTE — PROGRESS NOTE ADULT - SUBJECTIVE AND OBJECTIVE BOX
SUBJECTIVE:  Feels better.  Had sore abdominal pain overnight but no nausea or vomiting.  +Flatus and small BM today, starting on liquid diet.  _____________________________________________________  OBJECTIVE:    T(C): 36.7, Max: 36.7 (06-20 @ 17:03)  HR: 69  BP: 121/74  RR: 18  SpO2: 97%  Wt(kg): --  I & Os for 24h ending 06-21 @ 07:00  =============================================  IN:    lactated ringers.: 1000 ml    IV PiggyBack: 100 ml    Total IN: 1100 ml  ---------------------------------------------  OUT:    Total OUT: 0 ml  ---------------------------------------------  Total NET: 1100 ml    I & Os for current day (as of 06-21 @ 15:51)  =============================================  IN:    Oral Fluid: 600 ml    Total IN: 600 ml  ---------------------------------------------  OUT:    Total OUT: 0 ml  ---------------------------------------------  Total NET: 600 ml    PHYSICAL EXAM:      Constitutional: NAD    Respiratory: CTA    Cardiovascular: RR    Gastrointestinal: soft, ntnd, +bs, no r/g/hsm    Extremities: no c/c/e      _____________________________________________________  LABS:                        12.3   8.31  )-----------( 242      ( 21 Jun 2017 07:14 )             36.2     06-21    142  |  102  |  6<L>  ----------------------------<  88  3.3<L>   |  26  |  0.63    Ca    8.9      21 Jun 2017 07:26  Phos  3.0     06-21  Mg     2.1     06-21        _____________________________________________________  ACTIVE MEDS:  MEDICATIONS  (STANDING):  enoxaparin Injectable 40milliGRAM(s) SubCutaneous daily  metoprolol Injectable 5milliGRAM(s) IV Push every 6 hours  dextrose 5% + sodium chloride 0.45% with potassium chloride 20 mEq/L 1000milliLiter(s) IV Continuous <Continuous>  famotidine    Tablet 20milliGRAM(s) Oral daily    MEDICATIONS  (PRN):  ALBUTerol/ipratropium for Nebulization 3milliLiter(s) Nebulizer every 6 hours PRN Shortness of Breath and/or Wheezing    _____________________________________________________  ASSESSMENT:  62yFemale with clinically improved SBO and CT findings of ?growths in small bowel    PLAN:  1.  As long as clinically she is doing well, agree with advancing diet and outpatient enterography to better assess the small bowel

## 2017-06-21 NOTE — PROGRESS NOTE ADULT - PROBLEM SELECTOR PLAN 1
-NPO  -Monitor GI function  -Patient should not have PRN pain medications. If she is in pain then she should have an abdominal exam prior to prescribing a one time dose of pain medication to monitor her symptoms. If she has increasing pain or distention the surgery team should be notified (pager [phone]). Overnight medicine team 1 resident was notified.    -If patient has worsening pain, increasing pain med requirements, worsening nausea, or emesis then she needs an NGT for decompression. This was discussed with the patient.  -Will discuss with attending -NPO  -Monitor GI function  -serial abd exams  -NGT if pain worsens or she has emesis  -OOB, ambulate

## 2017-06-21 NOTE — PROGRESS NOTE ADULT - SUBJECTIVE AND OBJECTIVE BOX
GENERAL SURGERY DAILY PROGRESS NOTE:     Subjective: Pt reports abdominal pain has improved, but is still present. She passed flatus once last night. No BM. She reports minimal nausea, no emesis.     Objective:  Vital Signs Last 24 Hrs  T(C): 36.6, Max: 36.6 ( @ 07:30)  T(F): 97.8, Max: 97.8 ( @ 07:30)  HR: 65 (64 - 69)  BP: 104/63 (104/63 - 139/68)  BP(mean): --  RR: 18 (16 - 18)  SpO2: 95% (95% - 96%)    I&O's Detail    I & Os for current day (as of 2017 06:51)  =============================================  IN:    sodium chloride 0.9%.: 900 ml    Total IN: 900 ml  ---------------------------------------------  OUT:    Total OUT: 0 ml  ---------------------------------------------  Total NET: 900 ml      Physical:  General: NAD  Abdomen: soft, ND, minimally tender in LLQ and RLQ. no HSM. no palpable masses.     MEDICATIONS  (STANDING):  sodium chloride 0.9%. 1000milliLiter(s) IV Continuous <Continuous>  metoprolol 25milliGRAM(s) Oral two times a day  enoxaparin Injectable 40milliGRAM(s) SubCutaneous daily  famotidine    Tablet 20milliGRAM(s) Oral two times a day  hydrochlorothiazide    Capsule 12.5milliGRAM(s) Oral daily    MEDICATIONS  (PRN):  ondansetron Injectable 4milliGRAM(s) IV Push every 4 hours PRN Nausea and/or Vomiting  morphine  - Injectable 2milliGRAM(s) IV Push every 8 hours PRN Severe Pain (7 - 10)  acetaminophen  IVPB. 1000milliGRAM(s) IV Intermittent once PRN Moderate Pain (4 - 6)  oxyCODONE IR 5milliGRAM(s) Oral every 6 hours PRN Moderate Pain (4 - 6)      LABS:                        13.0   10.5  )-----------( 230      ( 2017 18:09 )             37.0     06-18    143  |  101  |  19  ----------------------------<  99  3.8   |  26  |  0.70    Ca    10.0      2017 18:09    TPro  6.8  /  Alb  4.1  /  TBili  0.3  /  DBili  x   /  AST  21  /  ALT  32  /  AlkPhos  62  06-18    PT/INR - ( 2017 18:09 )   PT: 11.2 sec;   INR: 1.03 ratio         PTT - ( 2017 18:09 )  PTT:32.3 sec  Urinalysis Basic - ( 2017 18:09 )    Color: Yellow / Appearance: Clear / S.025 / pH: x  Gluc: x / Ketone: Negative  / Bili: Negative / Urobili: Negative   Blood: x / Protein: 30 mg/dL / Nitrite: Negative   Leuk Esterase: Small / RBC: 3-5 /HPF / WBC 3-5 /HPF   Sq Epi: x / Non Sq Epi: OCC /HPF / Bacteria: Few /HPF GENERAL SURGERY DAILY PROGRESS NOTE:     Subjective: Pt reports abdominal pain has improved, but is still present. She passed flatus once last night. No BM. She reports minimal nausea, no emesis.     Objective:  Vital Signs Last 24 Hrs  T(C): 36.6, Max: 36.6 ( @ 07:30)  T(F): 97.8, Max: 97.8 ( @ 07:30)  HR: 65 (64 - 69)  BP: 104/63 (104/63 - 139/68)  BP(mean): --  RR: 18 (16 - 18)  SpO2: 95% (95% - 96%)    I&O's Detail    I & Os for current day (as of 2017 06:51)  =============================================  IN:    sodium chloride 0.9%.: 900 ml    Total IN: 900 ml  ---------------------------------------------  OUT:    Total OUT: 0 ml  ---------------------------------------------  Total NET: 900 ml      Physical:  General: NAD  Resp: airway patent, respirations unlabored, no increased WoB  CVS: RRR  Abdomen: soft, ND, mild TTP lower abdomen, no rebound or guarding  Extremities: no edema  Neuro: AAOx3, no focal deficits    MEDICATIONS  (STANDING):  sodium chloride 0.9%. 1000milliLiter(s) IV Continuous <Continuous>  metoprolol 25milliGRAM(s) Oral two times a day  enoxaparin Injectable 40milliGRAM(s) SubCutaneous daily  famotidine    Tablet 20milliGRAM(s) Oral two times a day  hydrochlorothiazide    Capsule 12.5milliGRAM(s) Oral daily    MEDICATIONS  (PRN):  ondansetron Injectable 4milliGRAM(s) IV Push every 4 hours PRN Nausea and/or Vomiting  morphine  - Injectable 2milliGRAM(s) IV Push every 8 hours PRN Severe Pain (7 - 10)  acetaminophen  IVPB. 1000milliGRAM(s) IV Intermittent once PRN Moderate Pain (4 - 6)  oxyCODONE IR 5milliGRAM(s) Oral every 6 hours PRN Moderate Pain (4 - 6)      LABS:                        13.0   10.5  )-----------( 230      ( 2017 18:09 )             37.0     06-18    143  |  101  |  19  ----------------------------<  99  3.8   |  26  |  0.70    Ca    10.0      2017 18:09    TPro  6.8  /  Alb  4.1  /  TBili  0.3  /  DBili  x   /  AST  21  /  ALT  32  /  AlkPhos  62  -18    PT/INR - ( 2017 18:09 )   PT: 11.2 sec;   INR: 1.03 ratio         PTT - ( 2017 18:09 )  PTT:32.3 sec  Urinalysis Basic - ( 2017 18:09 )    Color: Yellow / Appearance: Clear / S.025 / pH: x  Gluc: x / Ketone: Negative  / Bili: Negative / Urobili: Negative   Blood: x / Protein: 30 mg/dL / Nitrite: Negative   Leuk Esterase: Small / RBC: 3-5 /HPF / WBC 3-5 /HPF   Sq Epi: x / Non Sq Epi: OCC /HPF / Bacteria: Few /HPF

## 2017-06-21 NOTE — CHART NOTE - NSCHARTNOTEFT_GEN_A_CORE
patient seen and examined at bedside throughout the night at approximately 9pm, 1am, and 4 am.  Patient c/o pain and tenderness in LLQ, that is tolerable without medications. Denies N/V. negative flatus, BM. AVSS. Patient physical exam- NAD, A&O x 3, abdominal exam- softly distended, TTP in LLQ. will continue to monitor and perform serial abdominal exams. Notify team if worsening pain # [phone]. patient seen and examined at bedside throughout the night at approximately 9pm, 1am, and 4 am.  Patient c/o pain and tenderness in LLQ, that is tolerable without medications. Denies N/V. + flatus,+ BM. AVSS. Patient physical exam- NAD, A&O x 3, abdominal exam- softly distended, TTP in LLQ. will continue to monitor and perform serial abdominal exams. Notify team if worsening pain # [phone]. patient seen and examined at bedside throughout the night at approximately 9pm, 1am, and 4 am.  Patient c/o pain and tenderness in LLQ, that is tolerable without medications. Patient states that her pain is improving.  Denies N/V. + flatus,+ BM. AVSS. Patient physical exam- NAD, A&O x 3, abdominal exam- softly distended, TTP in LLQ. will continue to monitor and perform serial abdominal exams. Notify team if worsening pain # [phone].

## 2017-06-22 VITALS
RESPIRATION RATE: 18 BRPM | TEMPERATURE: 99 F | DIASTOLIC BLOOD PRESSURE: 85 MMHG | OXYGEN SATURATION: 96 % | SYSTOLIC BLOOD PRESSURE: 150 MMHG | HEART RATE: 98 BPM

## 2017-06-22 LAB
ANION GAP SERPL CALC-SCNC: 13 MMOL/L — SIGNIFICANT CHANGE UP (ref 5–17)
BUN SERPL-MCNC: 5 MG/DL — LOW (ref 7–23)
CALCIUM SERPL-MCNC: 9.2 MG/DL — SIGNIFICANT CHANGE UP (ref 8.4–10.5)
CHLORIDE SERPL-SCNC: 103 MMOL/L — SIGNIFICANT CHANGE UP (ref 96–108)
CO2 SERPL-SCNC: 26 MMOL/L — SIGNIFICANT CHANGE UP (ref 22–31)
CREAT SERPL-MCNC: 0.51 MG/DL — SIGNIFICANT CHANGE UP (ref 0.5–1.3)
GLUCOSE SERPL-MCNC: 108 MG/DL — HIGH (ref 70–99)
MAGNESIUM SERPL-MCNC: 2 MG/DL — SIGNIFICANT CHANGE UP (ref 1.6–2.6)
PHOSPHATE SERPL-MCNC: 2.7 MG/DL — SIGNIFICANT CHANGE UP (ref 2.5–4.5)
POTASSIUM SERPL-MCNC: 3.7 MMOL/L — SIGNIFICANT CHANGE UP (ref 3.5–5.3)
POTASSIUM SERPL-SCNC: 3.7 MMOL/L — SIGNIFICANT CHANGE UP (ref 3.5–5.3)
SODIUM SERPL-SCNC: 142 MMOL/L — SIGNIFICANT CHANGE UP (ref 135–145)

## 2017-06-22 PROCEDURE — 82947 ASSAY GLUCOSE BLOOD QUANT: CPT

## 2017-06-22 PROCEDURE — 84132 ASSAY OF SERUM POTASSIUM: CPT

## 2017-06-22 PROCEDURE — 83735 ASSAY OF MAGNESIUM: CPT

## 2017-06-22 PROCEDURE — 96375 TX/PRO/DX INJ NEW DRUG ADDON: CPT

## 2017-06-22 PROCEDURE — 82565 ASSAY OF CREATININE: CPT

## 2017-06-22 PROCEDURE — 80053 COMPREHEN METABOLIC PANEL: CPT

## 2017-06-22 PROCEDURE — 82330 ASSAY OF CALCIUM: CPT

## 2017-06-22 PROCEDURE — 74177 CT ABD & PELVIS W/CONTRAST: CPT | Mod: 26

## 2017-06-22 PROCEDURE — 82803 BLOOD GASES ANY COMBINATION: CPT

## 2017-06-22 PROCEDURE — 87086 URINE CULTURE/COLONY COUNT: CPT

## 2017-06-22 PROCEDURE — 96374 THER/PROPH/DIAG INJ IV PUSH: CPT | Mod: XU

## 2017-06-22 PROCEDURE — 82435 ASSAY OF BLOOD CHLORIDE: CPT

## 2017-06-22 PROCEDURE — 99285 EMERGENCY DEPT VISIT HI MDM: CPT | Mod: 25

## 2017-06-22 PROCEDURE — 84100 ASSAY OF PHOSPHORUS: CPT

## 2017-06-22 PROCEDURE — 80048 BASIC METABOLIC PNL TOTAL CA: CPT

## 2017-06-22 PROCEDURE — 93005 ELECTROCARDIOGRAM TRACING: CPT

## 2017-06-22 PROCEDURE — 85730 THROMBOPLASTIN TIME PARTIAL: CPT

## 2017-06-22 PROCEDURE — 94640 AIRWAY INHALATION TREATMENT: CPT

## 2017-06-22 PROCEDURE — 85014 HEMATOCRIT: CPT

## 2017-06-22 PROCEDURE — 74177 CT ABD & PELVIS W/CONTRAST: CPT

## 2017-06-22 PROCEDURE — 85027 COMPLETE CBC AUTOMATED: CPT

## 2017-06-22 PROCEDURE — 83605 ASSAY OF LACTIC ACID: CPT

## 2017-06-22 PROCEDURE — 74018 RADEX ABDOMEN 1 VIEW: CPT

## 2017-06-22 PROCEDURE — 85610 PROTHROMBIN TIME: CPT

## 2017-06-22 PROCEDURE — 71045 X-RAY EXAM CHEST 1 VIEW: CPT

## 2017-06-22 PROCEDURE — 81001 URINALYSIS AUTO W/SCOPE: CPT

## 2017-06-22 PROCEDURE — 84295 ASSAY OF SERUM SODIUM: CPT

## 2017-06-22 PROCEDURE — 96376 TX/PRO/DX INJ SAME DRUG ADON: CPT

## 2017-06-22 PROCEDURE — 74020: CPT

## 2017-06-22 RX ORDER — POLYETHYLENE GLYCOL 3350 17 G/17G
17 POWDER, FOR SOLUTION ORAL
Qty: 0 | Refills: 0 | COMMUNITY

## 2017-06-22 RX ORDER — DIPHENHYDRAMINE HCL 50 MG
50 CAPSULE ORAL ONCE
Qty: 0 | Refills: 0 | Status: COMPLETED | OUTPATIENT
Start: 2017-06-22 | End: 2017-06-22

## 2017-06-22 RX ORDER — DIPHENHYDRAMINE HCL 50 MG
50 CAPSULE ORAL ONCE
Qty: 0 | Refills: 0 | Status: COMPLETED | OUTPATIENT
Start: 2017-06-22 | End: 2018-05-21

## 2017-06-22 RX ADMIN — ENOXAPARIN SODIUM 40 MILLIGRAM(S): 100 INJECTION SUBCUTANEOUS at 11:24

## 2017-06-22 RX ADMIN — Medication 40 MILLIGRAM(S): at 08:08

## 2017-06-22 RX ADMIN — Medication 50 MILLIGRAM(S): at 11:29

## 2017-06-22 RX ADMIN — Medication 5 MILLIGRAM(S): at 05:31

## 2017-06-22 RX ADMIN — Medication 5 MILLIGRAM(S): at 16:57

## 2017-06-22 RX ADMIN — DEXTROSE MONOHYDRATE, SODIUM CHLORIDE, AND POTASSIUM CHLORIDE 30 MILLILITER(S): 50; .745; 4.5 INJECTION, SOLUTION INTRAVENOUS at 11:25

## 2017-06-22 RX ADMIN — Medication 5 MILLIGRAM(S): at 11:23

## 2017-06-22 RX ADMIN — DEXTROSE MONOHYDRATE, SODIUM CHLORIDE, AND POTASSIUM CHLORIDE 30 MILLILITER(S): 50; .745; 4.5 INJECTION, SOLUTION INTRAVENOUS at 05:31

## 2017-06-22 RX ADMIN — FAMOTIDINE 20 MILLIGRAM(S): 10 INJECTION INTRAVENOUS at 16:57

## 2017-06-22 NOTE — PROGRESS NOTE ADULT - ASSESSMENT
62F with SBO
61 yo F w/PMHx GERD, HTN, asthma (well-controlled), hx spontaneously-resolving SBO and multiple abdominal surgeries p/w dull, non-radiating LLQ abdominal pain x 1 day; CT scan shows dilated loops of bowel - likely SBO.

## 2017-06-22 NOTE — DISCHARGE NOTE ADULT - CARE PROVIDERS DIRECT ADDRESSES
[email or web address]

## 2017-06-22 NOTE — PROGRESS NOTE ADULT - SUBJECTIVE AND OBJECTIVE BOX
SUBJECTIVE:  Reports LLQ pain radiating to RLQ.  +BMs, +flatus, no bloating and tolerated liquid diet yesterday.  _____________________________________________________  OBJECTIVE:    T(C): 36.6, Max: 36.7 (06-21 @ 15:46)  HR: 65  BP: 122/70  RR: 16  SpO2: 96%  Wt(kg): --    I & Os for current day (as of 06-22 @ 08:02)  =============================================  IN:    Oral Fluid: 840 ml    Total IN: 840 ml  ---------------------------------------------  OUT:    Total OUT: 0 ml  ---------------------------------------------  Total NET: 840 ml    PHYSICAL EXAM:      Constitutional: NAD    Respiratory: CTA    Cardiovascular: RR    Gastrointestinal: LLQ tenderness, soft non distended abdomen, no r/g/HSM    Extremities: no C/C    _____________________________________________________  LABS:                        12.3   8.31  )-----------( 242      ( 21 Jun 2017 07:14 )             36.2     06-21    142  |  102  |  6<L>  ----------------------------<  88  3.3<L>   |  26  |  0.63    Ca    8.9      21 Jun 2017 07:26  Phos  3.0     06-21  Mg     2.1     06-21        _____________________________________________________  ACTIVE MEDS:  MEDICATIONS  (STANDING):  enoxaparin Injectable 40milliGRAM(s) SubCutaneous daily  metoprolol Injectable 5milliGRAM(s) IV Push every 6 hours  dextrose 5% + sodium chloride 0.45% with potassium chloride 20 mEq/L 1000milliLiter(s) IV Continuous <Continuous>  famotidine    Tablet 20milliGRAM(s) Oral daily    MEDICATIONS  (PRN):  ALBUTerol/ipratropium for Nebulization 3milliLiter(s) Nebulizer every 6 hours PRN Shortness of Breath and/or Wheezing    _____________________________________________________  ASSESSMENT:  62yFemale with SBO clinically improved yet with persistent pain - as discussed with Dr. Clark re-imaging is indicated    PLAN:  1.  CT abd/pelvis with IV/PO contrast pending for today  2.  Further treatment to be based on results

## 2017-06-22 NOTE — DISCHARGE NOTE ADULT - HOSPITAL COURSE
63 yo F w/PMHx GERD, HTN, asthma (well-controlled), hx spontaneously-resolving SBO and multiple abdominal surgeries p/w dull, non-radiating LLQ abdominal pain x 1 day. Pt started experiencing progressive pain in her abdomen yesterday AM and had a normal, formed BM at that time. She had mild nausea at that point but no vomiting. Pt took Pepto-Bismol and aspirin, but after symptoms did not resolve, on recommendation of her PMD Dr. Willard, pt presented to ER for further medical evaluation. Denies F/C, dizziness, vomiting, association with food intake, melena/hematochezia, dysuria, recent diet changes, medication changes, travel. Previous  EGD/colonoscopy on 5/13/16 normal aside from hemorrhoids noted during colonoscopy. CT scan done showed Dilated small bowel loops with multiple transition points an apparent luminal filling defects. Pt initially admitted to medicine NPO. Surgery consulted recommended NGT, unable to place. Abd with contrast throughout colon, SB improved.  Had flatus.  Will give fleet enema for chronic constipation and contrast in colon, trial of clears. Pt complained of abd pain underwent repeat CT IMPRESSION: No acute abnormality. Improved small bowel obstruction. Pain improved and had further flatus/BM- diet advanced to LRD. pt discharged home to follow up with GI, surgery, emdicine Pt ambulating, voiding, tolerating diet on discharge

## 2017-06-22 NOTE — PROGRESS NOTE ADULT - ATTENDING COMMENTS
I have seen patient and discussed with team.  Source of rebound tenderness unclear.  Patient otherwise looks well and clinically has resolving SBO.  Will get CT
Patient feeling better this am. AXR with contrast throughout colon, SB improved.  Had flatus.  Will give fleet enema for chronic constipation and contrast in colon, trial of clears
I saw patient and discussed with medicine, surgery and GI team.  .  Will place NGT and if not better later today may need OR.  Will transfer to surgical service.  Will get AXR and labs this am and continue to observe closely.  I also spoke to patient  and answered all questions
SBO  - being transferred to surgery service  - trial of NGT and might need surgical intervention, if not improved

## 2017-06-22 NOTE — DISCHARGE NOTE ADULT - CARE PROVIDER_API CALL
Pollo Clark), ColonRectal Surgery; Surgery  [address] [place] [address]  Phone: [phone]  Fax: [phone]    Car Comer), Gastroenterology  [address]  [place] [address]  Phone: [phone]  Fax: [phone]

## 2017-06-22 NOTE — PROGRESS NOTE ADULT - SUBJECTIVE AND OBJECTIVE BOX
GENERAL SURGERY DAILY PROGRESS NOTE:     Hospital Day: 4    Postoperative Day: x      S: Pt has had clears but has discomfort with drinking. Pt continues to have flatus. Says her pain is slowly improving.      O:T(C): 36.6, Max: 36.7 (06-21 @ 15:46)  HR: 65 (65 - 71)  BP: 122/70 (109/67 - 128/67)  RR: 16 (16 - 18)  SpO2: 96% (95% - 98%)  Wt(kg): --                        12.3   8.31  )-----------( 242      ( 21 Jun 2017 07:14 )             36.2     06-21    142  |  102  |  6   ----------------------------<  88  3.3   |  26  |  0.63    Ca    8.9      21 Jun 2017 07:26  Phos  3.0     06-21  Mg     2.1     06-21        I & Os for current day (as of 06-22 @ 08:28)  =============================================  IN: 840 ml / OUT: 0 ml / NET: 840 ml          General: alert and oriented, NAD  Resp: airway patent, respirations unlabored  CVS: regular rate and rhythm  Abdomen: soft, rebound tenderness diffusely, no guarding, mildly distended  Extremities: no edema

## 2017-06-22 NOTE — DISCHARGE NOTE ADULT - MEDICATION SUMMARY - MEDICATIONS TO TAKE
I will START or STAY ON the medications listed below when I get home from the hospital:    naproxen 500 mg oral tablet  -- 1 tab(s) by mouth 2 times a day (with meals), As Needed  -- As per patient, naproxen is taken occasionally for pain/inflammation.   -- Indication: For As needed for pain    Metoprolol Succinate ER 50 mg oral tablet, extended release  -- 1 tab(s) by mouth once a day (in the evening)  -- Indication: For High blood pressure    chlorthalidone 25 mg oral tablet  -- 1 tab(s) by mouth once a day (in the morning)  -- Indication: For High blood pressure    Zantac 150 oral tablet  -- 2 tab(s) by mouth once a day  -- Indication: For reflux    MiraLax oral powder for reconstitution  -- 17 gram(s) by mouth one to two times daily for constipation   -- Indication: For constipation    cyclobenzaprine 10 mg oral tablet  -- 1 tab(s) by mouth once a day, As Needed  -- As per patient, cyclobenzaprine is taken occasionally (a few times per year) as needed for jaw stiffness.   -- Indication: For Stifness

## 2017-06-22 NOTE — DISCHARGE NOTE ADULT - CARE PLAN
Principal Discharge DX:	SBO (small bowel obstruction)  Goal:	tolerate diet  Instructions for follow-up, activity and diet:	low residue diet as tolerated, miralax 1-2 times daily for constipation, follow up with GI Dr. Comer in 1 week, surgery Dr. Clark in 1 week    notify Dr. Clark if stop passing gas, having BM, unable to tolerate diet  Instructions for follow-up, activity and diet:	Please follow up with your regular medical doctor Dr. Willard in 1 week, please call to schedule an appointment to discuss recent hospitalization

## 2017-06-22 NOTE — DISCHARGE NOTE ADULT - PLAN OF CARE
tolerate diet low residue diet as tolerated, miralax 1-2 times daily for constipation, follow up with GI Dr. Comer in 1 week, surgery Dr. Clark in 1 week    notify Dr. Clark if stop passing gas, having BM, unable to tolerate diet Please follow up with your regular medical doctor Dr. Willard in 1 week, please call to schedule an appointment to discuss recent hospitalization

## 2017-06-22 NOTE — PROGRESS NOTE ADULT - SUBJECTIVE AND OBJECTIVE BOX
Green Surgery  Pt. seen and examined  Endorses some lower abdominal discomfort. Tried clears but had some bloating and discomfort, so stopped. +flatus  Vital Signs Last 24 Hrs  T(C): 36.5, Max: 36.7 (06-21 @ 04:18)  T(F): 97.7, Max: 98.1 (06-21 @ 04:18)  HR: 67 (64 - 76)  BP: 128/67 (109/67 - 128/67)  BP(mean): --  RR: 18 (16 - 18)  SpO2: 95% (95% - 98%)    NAD, awake and alert  Respirations nonlabored  Abdomen soft, nondistended, tender lower>upper  No guarding or rebound tenderness     A/P: 62F w/ SBO, resolving, but still with discomfort, not tolerating clears yet  - serial exams  - CLD slowly  - f/u GI fxn  - I/Os  -VTE ppx  -OOB

## 2017-06-22 NOTE — DISCHARGE NOTE ADULT - PATIENT PORTAL LINK FT
“You can access the FollowHealth Patient Portal, offered by [facility], by registering with the following website: [email or web address]”

## 2017-07-12 PROBLEM — I10 ESSENTIAL (PRIMARY) HYPERTENSION: Chronic | Status: ACTIVE | Noted: 2017-06-18

## 2017-07-14 ENCOUNTER — NON-APPOINTMENT (OUTPATIENT)
Age: 62
End: 2017-07-14

## 2017-07-14 ENCOUNTER — APPOINTMENT (OUTPATIENT)
Dept: CARDIOLOGY | Facility: CLINIC | Age: 62
End: 2017-07-14

## 2017-07-14 VITALS
OXYGEN SATURATION: 98 % | WEIGHT: 141 LBS | HEIGHT: 60 IN | SYSTOLIC BLOOD PRESSURE: 132 MMHG | DIASTOLIC BLOOD PRESSURE: 75 MMHG | HEART RATE: 72 BPM | RESPIRATION RATE: 14 BRPM | BODY MASS INDEX: 27.68 KG/M2

## 2017-07-19 ENCOUNTER — APPOINTMENT (OUTPATIENT)
Dept: CV DIAGNOSITCS | Facility: HOSPITAL | Age: 62
End: 2017-07-19

## 2017-07-19 ENCOUNTER — OUTPATIENT (OUTPATIENT)
Dept: OUTPATIENT SERVICES | Facility: HOSPITAL | Age: 62
LOS: 1 days | End: 2017-07-19
Payer: COMMERCIAL

## 2017-07-19 DIAGNOSIS — Z98.890 OTHER SPECIFIED POSTPROCEDURAL STATES: Chronic | ICD-10-CM

## 2017-07-19 DIAGNOSIS — I10 ESSENTIAL (PRIMARY) HYPERTENSION: ICD-10-CM

## 2017-07-19 PROCEDURE — 93306 TTE W/DOPPLER COMPLETE: CPT | Mod: 26

## 2017-07-28 ENCOUNTER — APPOINTMENT (OUTPATIENT)
Dept: CARDIOLOGY | Facility: CLINIC | Age: 62
End: 2017-07-28

## 2018-03-23 ENCOUNTER — LABORATORY RESULT (OUTPATIENT)
Age: 63
End: 2018-03-23

## 2018-03-23 ENCOUNTER — APPOINTMENT (OUTPATIENT)
Dept: CARDIOLOGY | Facility: CLINIC | Age: 63
End: 2018-03-23
Payer: COMMERCIAL

## 2018-03-23 ENCOUNTER — NON-APPOINTMENT (OUTPATIENT)
Age: 63
End: 2018-03-23

## 2018-03-23 VITALS
HEART RATE: 75 BPM | BODY MASS INDEX: 30.63 KG/M2 | DIASTOLIC BLOOD PRESSURE: 78 MMHG | OXYGEN SATURATION: 99 % | WEIGHT: 156 LBS | HEIGHT: 60 IN | SYSTOLIC BLOOD PRESSURE: 122 MMHG

## 2018-03-23 PROCEDURE — 93000 ELECTROCARDIOGRAM COMPLETE: CPT

## 2018-03-23 PROCEDURE — 36415 COLL VENOUS BLD VENIPUNCTURE: CPT

## 2018-03-23 PROCEDURE — 99215 OFFICE O/P EST HI 40 MIN: CPT

## 2018-03-27 LAB
ALBUMIN SERPL ELPH-MCNC: 4.8 G/DL
ALP BLD-CCNC: 70 U/L
ALT SERPL-CCNC: 27 U/L
ANA SER IF-ACNC: NEGATIVE
ANION GAP SERPL CALC-SCNC: 16 MMOL/L
AST SERPL-CCNC: 23 U/L
BASOPHILS # BLD AUTO: 0.03 K/UL
BASOPHILS NFR BLD AUTO: 0.4 %
BILIRUB SERPL-MCNC: 0.2 MG/DL
BUN SERPL-MCNC: 19 MG/DL
CALCIUM SERPL-MCNC: 10 MG/DL
CHLORIDE SERPL-SCNC: 101 MMOL/L
CHOLEST SERPL-MCNC: 255 MG/DL
CHOLEST/HDLC SERPL: 3.8 RATIO
CO2 SERPL-SCNC: 27 MMOL/L
CREAT SERPL-MCNC: 0.8 MG/DL
EOSINOPHIL # BLD AUTO: 0.16 K/UL
EOSINOPHIL NFR BLD AUTO: 2.2 %
GLUCOSE SERPL-MCNC: 96 MG/DL
HBA1C MFR BLD HPLC: 5.8 %
HCT VFR BLD CALC: 39.1 %
HDLC SERPL-MCNC: 67 MG/DL
HGB BLD-MCNC: 13 G/DL
IMM GRANULOCYTES NFR BLD AUTO: 0.3 %
LDLC SERPL CALC-MCNC: 161 MG/DL
LYMPHOCYTES # BLD AUTO: 2.99 K/UL
LYMPHOCYTES NFR BLD AUTO: 41 %
MAN DIFF?: NORMAL
MCHC RBC-ENTMCNC: 28.8 PG
MCHC RBC-ENTMCNC: 33.2 GM/DL
MCV RBC AUTO: 86.7 FL
MONOCYTES # BLD AUTO: 0.36 K/UL
MONOCYTES NFR BLD AUTO: 4.9 %
NEUTROPHILS # BLD AUTO: 3.74 K/UL
NEUTROPHILS NFR BLD AUTO: 51.2 %
PLATELET # BLD AUTO: 263 K/UL
POTASSIUM SERPL-SCNC: 4.1 MMOL/L
PROT SERPL-MCNC: 7.3 G/DL
RBC # BLD: 4.51 M/UL
RBC # FLD: 14 %
SODIUM SERPL-SCNC: 144 MMOL/L
TRIGL SERPL-MCNC: 133 MG/DL
TSH SERPL-ACNC: 1.45 UIU/ML
WBC # FLD AUTO: 7.3 K/UL

## 2018-07-27 ENCOUNTER — NON-APPOINTMENT (OUTPATIENT)
Age: 63
End: 2018-07-27

## 2018-07-27 ENCOUNTER — APPOINTMENT (OUTPATIENT)
Dept: CARDIOLOGY | Facility: CLINIC | Age: 63
End: 2018-07-27
Payer: COMMERCIAL

## 2018-07-27 VITALS
HEART RATE: 72 BPM | SYSTOLIC BLOOD PRESSURE: 122 MMHG | DIASTOLIC BLOOD PRESSURE: 76 MMHG | OXYGEN SATURATION: 97 % | BODY MASS INDEX: 29.69 KG/M2 | WEIGHT: 152 LBS

## 2018-07-27 DIAGNOSIS — R09.89 OTHER SPECIFIED SYMPTOMS AND SIGNS INVOLVING THE CIRCULATORY AND RESPIRATORY SYSTEMS: ICD-10-CM

## 2018-07-27 PROBLEM — K21.9 GASTRO-ESOPHAGEAL REFLUX DISEASE WITHOUT ESOPHAGITIS: Chronic | Status: ACTIVE | Noted: 2017-06-19

## 2018-07-27 PROCEDURE — 36415 COLL VENOUS BLD VENIPUNCTURE: CPT

## 2018-07-27 PROCEDURE — 93000 ELECTROCARDIOGRAM COMPLETE: CPT

## 2018-07-27 PROCEDURE — 99215 OFFICE O/P EST HI 40 MIN: CPT

## 2018-07-31 LAB
ALBUMIN SERPL ELPH-MCNC: 4.8 G/DL
ALP BLD-CCNC: 67 U/L
ALT SERPL-CCNC: 24 U/L
ANION GAP SERPL CALC-SCNC: 16 MMOL/L
AST SERPL-CCNC: 21 U/L
BASOPHILS # BLD AUTO: 0.04 K/UL
BASOPHILS NFR BLD AUTO: 0.6 %
BILIRUB SERPL-MCNC: 0.3 MG/DL
BUN SERPL-MCNC: 20 MG/DL
CALCIUM SERPL-MCNC: 9.7 MG/DL
CHLORIDE SERPL-SCNC: 101 MMOL/L
CHOLEST SERPL-MCNC: 228 MG/DL
CHOLEST/HDLC SERPL: 3.4 RATIO
CO2 SERPL-SCNC: 27 MMOL/L
CREAT SERPL-MCNC: 0.62 MG/DL
EOSINOPHIL # BLD AUTO: 0.14 K/UL
EOSINOPHIL NFR BLD AUTO: 1.9 %
GLUCOSE SERPL-MCNC: 94 MG/DL
HBA1C MFR BLD HPLC: 5.7 %
HCT VFR BLD CALC: 40.3 %
HDLC SERPL-MCNC: 67 MG/DL
HGB BLD-MCNC: 12.8 G/DL
IMM GRANULOCYTES NFR BLD AUTO: 0.3 %
LDLC SERPL CALC-MCNC: 132 MG/DL
LYMPHOCYTES # BLD AUTO: 3.07 K/UL
LYMPHOCYTES NFR BLD AUTO: 42.8 %
MAN DIFF?: NORMAL
MCHC RBC-ENTMCNC: 27.9 PG
MCHC RBC-ENTMCNC: 31.8 GM/DL
MCV RBC AUTO: 87.8 FL
MONOCYTES # BLD AUTO: 0.26 K/UL
MONOCYTES NFR BLD AUTO: 3.6 %
NEUTROPHILS # BLD AUTO: 3.65 K/UL
NEUTROPHILS NFR BLD AUTO: 50.8 %
PLATELET # BLD AUTO: 265 K/UL
POTASSIUM SERPL-SCNC: 4 MMOL/L
PROT SERPL-MCNC: 6.9 G/DL
RBC # BLD: 4.59 M/UL
RBC # FLD: 14.1 %
SODIUM SERPL-SCNC: 144 MMOL/L
TRIGL SERPL-MCNC: 146 MG/DL
TSH SERPL-ACNC: 0.92 UIU/ML
WBC # FLD AUTO: 7.18 K/UL

## 2018-08-17 ENCOUNTER — APPOINTMENT (OUTPATIENT)
Dept: CV DIAGNOSTICS | Facility: HOSPITAL | Age: 63
End: 2018-08-17

## 2018-08-17 ENCOUNTER — APPOINTMENT (OUTPATIENT)
Dept: CV DIAGNOSITCS | Facility: HOSPITAL | Age: 63
End: 2018-08-17

## 2018-08-17 ENCOUNTER — OUTPATIENT (OUTPATIENT)
Dept: OUTPATIENT SERVICES | Facility: HOSPITAL | Age: 63
LOS: 1 days | End: 2018-08-17
Payer: COMMERCIAL

## 2018-08-17 DIAGNOSIS — R07.9 CHEST PAIN, UNSPECIFIED: ICD-10-CM

## 2018-08-17 DIAGNOSIS — Z98.890 OTHER SPECIFIED POSTPROCEDURAL STATES: Chronic | ICD-10-CM

## 2018-08-17 PROCEDURE — 93017 CV STRESS TEST TRACING ONLY: CPT

## 2018-08-17 PROCEDURE — 93306 TTE W/DOPPLER COMPLETE: CPT | Mod: 26

## 2018-08-17 PROCEDURE — 93306 TTE W/DOPPLER COMPLETE: CPT

## 2018-08-17 PROCEDURE — A9500: CPT

## 2018-08-17 PROCEDURE — 78452 HT MUSCLE IMAGE SPECT MULT: CPT | Mod: 26

## 2018-08-17 PROCEDURE — 78452 HT MUSCLE IMAGE SPECT MULT: CPT

## 2018-08-17 PROCEDURE — 93016 CV STRESS TEST SUPVJ ONLY: CPT

## 2018-08-17 PROCEDURE — 93018 CV STRESS TEST I&R ONLY: CPT

## 2018-08-24 ENCOUNTER — APPOINTMENT (OUTPATIENT)
Dept: ULTRASOUND IMAGING | Facility: HOSPITAL | Age: 63
End: 2018-08-24

## 2018-10-31 ENCOUNTER — APPOINTMENT (OUTPATIENT)
Dept: UROGYNECOLOGY | Facility: CLINIC | Age: 63
End: 2018-10-31

## 2018-11-02 ENCOUNTER — APPOINTMENT (OUTPATIENT)
Dept: UROGYNECOLOGY | Facility: CLINIC | Age: 63
End: 2018-11-02
Payer: COMMERCIAL

## 2018-11-02 VITALS
SYSTOLIC BLOOD PRESSURE: 119 MMHG | DIASTOLIC BLOOD PRESSURE: 72 MMHG | HEART RATE: 74 BPM | HEIGHT: 60 IN | BODY MASS INDEX: 29.84 KG/M2 | WEIGHT: 152 LBS

## 2018-11-02 DIAGNOSIS — Z83.3 FAMILY HISTORY OF DIABETES MELLITUS: ICD-10-CM

## 2018-11-02 DIAGNOSIS — Z78.9 OTHER SPECIFIED HEALTH STATUS: ICD-10-CM

## 2018-11-02 DIAGNOSIS — Z80.3 FAMILY HISTORY OF MALIGNANT NEOPLASM OF BREAST: ICD-10-CM

## 2018-11-02 DIAGNOSIS — N95.2 POSTMENOPAUSAL ATROPHIC VAGINITIS: ICD-10-CM

## 2018-11-02 DIAGNOSIS — Z82.49 FAMILY HISTORY OF ISCHEMIC HEART DISEASE AND OTHER DISEASES OF THE CIRCULATORY SYSTEM: ICD-10-CM

## 2018-11-02 DIAGNOSIS — Z83.42 FAMILY HISTORY OF FAMILIAL HYPERCHOLESTEROLEMIA: ICD-10-CM

## 2018-11-02 DIAGNOSIS — K59.00 CONSTIPATION, UNSPECIFIED: ICD-10-CM

## 2018-11-02 DIAGNOSIS — F52.9 UNSPECIFIED SEXUAL DYSFUNCTION NOT DUE TO A SUBSTANCE OR KNOWN PHYSIOLOGICAL CONDITION: ICD-10-CM

## 2018-11-02 DIAGNOSIS — N39.3 STRESS INCONTINENCE (FEMALE) (MALE): ICD-10-CM

## 2018-11-02 DIAGNOSIS — Z82.5 FAMILY HISTORY OF ASTHMA AND OTHER CHRONIC LOWER RESPIRATORY DISEASES: ICD-10-CM

## 2018-11-02 LAB
BILIRUB UR QL STRIP: NORMAL
CLARITY UR: CLEAR
COLLECTION METHOD: NORMAL
GLUCOSE UR-MCNC: NORMAL
HCG UR QL: 0.2 EU/DL
HGB UR QL STRIP.AUTO: NORMAL
KETONES UR-MCNC: NORMAL
LEUKOCYTE ESTERASE UR QL STRIP: NORMAL
NITRITE UR QL STRIP: NORMAL
PH UR STRIP: 7
PROT UR STRIP-MCNC: NORMAL
SP GR UR STRIP: 1.01

## 2018-11-02 PROCEDURE — 99204 OFFICE O/P NEW MOD 45 MIN: CPT

## 2018-11-06 NOTE — ED ADULT NURSE NOTE - SEVERITY
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 Next Procedure scheduled for Thursday  Attestation:   EVELIA Pérez was available by pager during the entire procedure. I have reviewed the chart and discussed the patient and current procedure with the apheresis nursing staff.       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 He is currently on ECP 2 x /week and prednisone 90 mg qod. He was restarted on ibrutinib about 3.5 weeks ago, which had been held because of a lung infection. For his skin, he has used mostly triamcinolone cream. Only intermittently used the fluocinonide ointment that was prescribed last visit.   He also has a H/O ongoing eye problems including continued left eye irritation most recently.  He has had eye cultures in the past and had follow up with ophthalmology to adjust antibiotic drops to treat an  Infection. He is followed by Opthalmology for GVHD in both eyes & Infectious crystalline keratopathy OS (Repeat culture 4/16 with redemonstration of enterococcus faecalis sensitive to vancomycin, PCN and resistant to gentamycin. Epi intact, Anterior basement membrane dystrophy).   He feels well otherwise & has been in his usual  [facility].       Past Medical History:     Past Medical History:   Diagnosis Date     Acute leukemia (H) 6/1/2014    ALL     Anxiety      Cholelithiasis 07/24/2014    peripherally calcified gallstone on 3/2016 CT scan     Diverticulosis of colon without diverticulitis 03/2016     Fungal pneumonia 6/10/2014     History of peripheral stem cell transplant (H) 02/13/2015     Hypertension              Past Surgical History:     Past Surgical History:   Procedure Laterality Date     COLONOSCOPY       INSERT CATHETER VASCULAR ACCESS DOUBLE LUMEN Right 2/6/2015    Procedure: INSERT CATHETER VASCULAR ACCESS DOUBLE LUMEN;  Surgeon: Michelle Vaca MD;  Location: [facility] OR     PICC INSERTION Right 6/9/2014              Social History:     Social History   Substance Use Topics     Smoking status: Never Smoker     Smokeless tobacco: Never Used     Alcohol use Yes      Comment: very occassional            Allergies:   No Known Allergies          Medications:     Current Outpatient Prescriptions   Medication Sig Dispense Refill     ascorbic acid (VITAMIN C) 1000 MG TABS Take 1 tablet (1,000 mg) by mouth daily 30 tablet 3     aspirin EC 81 MG tablet Take 1 tablet (81 mg) by mouth daily       buPROPion (WELLBUTRIN XL) 150 MG 24 hr tablet Take 1 tablet (150 mg) by mouth daily 90 tablet 3     carboxymethylcellul-glycerin (OPTIVE/REFRESH OPTIVE) 0.5-0.9 % SOLN ophthalmic solution Place 1 drop into both eyes 4 times daily       cycloSPORINE in olive oil 2 % ophthalmic emulsion Place 1 drop into both eyes 2 times daily 10 mL 3     fluocinonide (LIDEX) 0.05 % ointment Apply topically 2 times daily 60 g 3     gabapentin 8 % GEL topical PLO cream Apply thin layer to feet 3 times daily as needed for neuropathic pain 100 g 11     hydrochlorothiazide (HYDRODIURIL) 25 MG tablet Take 1 tablet (25 mg) by mouth 2 times daily 60 tablet 11     ibrutinib (IMBRUVICA) 140 MG capsule Take 2 capsules (280 mg) by mouth daily 60 capsule 2      levofloxacin (LEVAQUIN) 250 MG tablet Take 1 tablet (250 mg) by mouth daily 30 tablet 3     lidocaine (XYLOCAINE) 5 % ointment Apply topically as needed for moderate pain 50 g 1     lisinopril (PRINIVIL/ZESTRIL) 20 MG tablet Take 1 tablet (20 mg) by mouth daily       moxifloxacin (VIGAMOX) 0.5 % ophthalmic solution Place 1 drop into both eyes 2 times daily 1 Bottle 11     potassium chloride SA (K-DUR/KLOR-CON M) 20 MEQ CR tablet Take 1 tablet (20 mEq) by mouth daily       predniSONE (DELTASONE) 20 MG tablet Take 50 mg by mouth every other day        sulfamethoxazole-trimethoprim (BACTRIM DS/SEPTRA DS) 800-160 MG per tablet TAKE 1 TABLET BY MOUTH TWICE DAILY ON MONDAYS AND TUESDAYS 16 tablet 11     valGANciclovir (VALCYTE) 450 MG tablet TAKE 1 TABLET(450 MG) BY MOUTH TWICE DAILY 60 tablet 3     voriconazole (VFEND) 200 MG tablet Take 1.5 tablets (300 mg) by mouth 2 times daily 90 tablet 1     zolpidem (AMBIEN) 10 MG tablet TAKE 1 TABLET BY MOUTH EVERY NIGHT AT BEDTIME AS NEEDED FOR SLEEP 30 tablet 3     doxycycline (VIBRAMYCIN) 100 MG capsule Hold while on bactrim       tacrolimus (PROTOPIC) 0.03 % ointment Apply topically At Bedtime 30 g 1              Abbreviated Physical Exam:   /81  Pulse 63  Temp 97.6  F (36.4  C) (Oral)  Resp 16          Laboratory Data:     BMP  Recent Labs  Lab 11/02/18  1305      POTASSIUM 4.6   CHLORIDE 106   GILMA 8.8   CO2 26   BUN 19   CR 0.96   GLC 92     CBC  Recent Labs  Lab 11/06/18  0830 11/02/18  1305   WBC 9.7 9.9   RBC 4.33* 4.52   HGB 15.7 16.4   HCT 46.6 49.3   * 109*   MCH 36.3* 36.3*   MCHC 33.7 33.3   RDW 12.7 13.0    182            Procedure Summary:     A photopheresis was  performed. Peripheral veins were used for access. A Heparinized Saline prime was used but  Citrate  was the anticoagulant during the procedure.  The patient's vital signs were stable throughout and he tolerated the procedure well  Attestation:   EVELIA Pérez was available  by pager during the entire procedure. I have reviewed the chart and discussed the patient and current procedure with the apheresis nursing staff.    Lionel Pérez MD   Division of Transfusion Medicine   Department [place] Laboratory [place] [address]   Pager: [phone] or [phone]       PAIN SCALE 8 OF 10.

## 2019-01-02 ENCOUNTER — APPOINTMENT (OUTPATIENT)
Dept: UROGYNECOLOGY | Facility: CLINIC | Age: 64
End: 2019-01-02

## 2019-01-25 ENCOUNTER — NON-APPOINTMENT (OUTPATIENT)
Age: 64
End: 2019-01-25

## 2019-01-25 ENCOUNTER — APPOINTMENT (OUTPATIENT)
Dept: CARDIOLOGY | Facility: CLINIC | Age: 64
End: 2019-01-25
Payer: COMMERCIAL

## 2019-01-25 VITALS
HEART RATE: 68 BPM | WEIGHT: 152 LBS | OXYGEN SATURATION: 96 % | BODY MASS INDEX: 29.84 KG/M2 | HEIGHT: 60 IN | SYSTOLIC BLOOD PRESSURE: 124 MMHG | DIASTOLIC BLOOD PRESSURE: 73 MMHG

## 2019-01-25 PROCEDURE — 93000 ELECTROCARDIOGRAM COMPLETE: CPT

## 2019-01-25 PROCEDURE — 36415 COLL VENOUS BLD VENIPUNCTURE: CPT

## 2019-01-25 PROCEDURE — 99214 OFFICE O/P EST MOD 30 MIN: CPT

## 2019-01-28 ENCOUNTER — MEDICATION RENEWAL (OUTPATIENT)
Age: 64
End: 2019-01-28

## 2019-01-28 LAB
ALBUMIN SERPL ELPH-MCNC: 4.5 G/DL
ALP BLD-CCNC: 74 U/L
ALT SERPL-CCNC: 28 U/L
ANION GAP SERPL CALC-SCNC: 13 MMOL/L
AST SERPL-CCNC: 31 U/L
BASOPHILS # BLD AUTO: 0.04 K/UL
BASOPHILS NFR BLD AUTO: 0.5 %
BILIRUB SERPL-MCNC: 0.3 MG/DL
BUN SERPL-MCNC: 19 MG/DL
CALCIUM SERPL-MCNC: 10.1 MG/DL
CHLORIDE SERPL-SCNC: 99 MMOL/L
CHOLEST SERPL-MCNC: 251 MG/DL
CHOLEST/HDLC SERPL: 3.7 RATIO
CO2 SERPL-SCNC: 29 MMOL/L
CREAT SERPL-MCNC: 0.69 MG/DL
EOSINOPHIL # BLD AUTO: 0.17 K/UL
EOSINOPHIL NFR BLD AUTO: 2.1 %
GLUCOSE SERPL-MCNC: 106 MG/DL
HBA1C MFR BLD HPLC: 5.8 %
HCT VFR BLD CALC: 40.5 %
HDLC SERPL-MCNC: 67 MG/DL
HGB BLD-MCNC: 13.1 G/DL
IMM GRANULOCYTES NFR BLD AUTO: 0.2 %
LDLC SERPL CALC-MCNC: 145 MG/DL
LYMPHOCYTES # BLD AUTO: 3.23 K/UL
LYMPHOCYTES NFR BLD AUTO: 40.3 %
MAN DIFF?: NORMAL
MCHC RBC-ENTMCNC: 28.2 PG
MCHC RBC-ENTMCNC: 32.3 GM/DL
MCV RBC AUTO: 87.3 FL
MONOCYTES # BLD AUTO: 0.42 K/UL
MONOCYTES NFR BLD AUTO: 5.2 %
NEUTROPHILS # BLD AUTO: 4.14 K/UL
NEUTROPHILS NFR BLD AUTO: 51.7 %
PLATELET # BLD AUTO: 283 K/UL
POTASSIUM SERPL-SCNC: 3.5 MMOL/L
PROT SERPL-MCNC: 7.4 G/DL
RBC # BLD: 4.64 M/UL
RBC # FLD: 13.9 %
SODIUM SERPL-SCNC: 141 MMOL/L
TRIGL SERPL-MCNC: 196 MG/DL
TSH SERPL-ACNC: 0.92 UIU/ML
WBC # FLD AUTO: 8.02 K/UL

## 2019-05-03 ENCOUNTER — NON-APPOINTMENT (OUTPATIENT)
Age: 64
End: 2019-05-03

## 2019-05-03 ENCOUNTER — APPOINTMENT (OUTPATIENT)
Dept: CARDIOLOGY | Facility: CLINIC | Age: 64
End: 2019-05-03
Payer: COMMERCIAL

## 2019-05-03 VITALS
WEIGHT: 154 LBS | HEART RATE: 72 BPM | SYSTOLIC BLOOD PRESSURE: 116 MMHG | BODY MASS INDEX: 30.23 KG/M2 | OXYGEN SATURATION: 97 % | HEIGHT: 60 IN | DIASTOLIC BLOOD PRESSURE: 73 MMHG

## 2019-05-03 PROCEDURE — 99214 OFFICE O/P EST MOD 30 MIN: CPT

## 2019-05-03 PROCEDURE — 36415 COLL VENOUS BLD VENIPUNCTURE: CPT

## 2019-05-03 PROCEDURE — 93000 ELECTROCARDIOGRAM COMPLETE: CPT

## 2019-05-08 LAB
ALBUMIN SERPL ELPH-MCNC: 4.6 G/DL
ALP BLD-CCNC: 66 U/L
ALT SERPL-CCNC: 27 U/L
ANION GAP SERPL CALC-SCNC: 14 MMOL/L
AST SERPL-CCNC: 22 U/L
BASOPHILS # BLD AUTO: 0.05 K/UL
BASOPHILS NFR BLD AUTO: 0.8 %
BILIRUB SERPL-MCNC: 0.3 MG/DL
BUN SERPL-MCNC: 15 MG/DL
CALCIUM SERPL-MCNC: 9.6 MG/DL
CHLORIDE SERPL-SCNC: 100 MMOL/L
CHOLEST SERPL-MCNC: 188 MG/DL
CHOLEST/HDLC SERPL: 3 RATIO
CO2 SERPL-SCNC: 28 MMOL/L
CREAT SERPL-MCNC: 0.67 MG/DL
EOSINOPHIL # BLD AUTO: 0.15 K/UL
EOSINOPHIL NFR BLD AUTO: 2.4 %
ESTIMATED AVERAGE GLUCOSE: 120 MG/DL
GLUCOSE SERPL-MCNC: 104 MG/DL
HBA1C MFR BLD HPLC: 5.8 %
HCT VFR BLD CALC: 39.5 %
HDLC SERPL-MCNC: 63 MG/DL
HGB BLD-MCNC: 12.4 G/DL
IMM GRANULOCYTES NFR BLD AUTO: 0.3 %
LDLC SERPL CALC-MCNC: 101 MG/DL
LYMPHOCYTES # BLD AUTO: 2.53 K/UL
LYMPHOCYTES NFR BLD AUTO: 40.5 %
MAN DIFF?: NORMAL
MCHC RBC-ENTMCNC: 28.1 PG
MCHC RBC-ENTMCNC: 31.4 GM/DL
MCV RBC AUTO: 89.6 FL
MONOCYTES # BLD AUTO: 0.3 K/UL
MONOCYTES NFR BLD AUTO: 4.8 %
NEUTROPHILS # BLD AUTO: 3.19 K/UL
NEUTROPHILS NFR BLD AUTO: 51.2 %
PLATELET # BLD AUTO: 255 K/UL
POTASSIUM SERPL-SCNC: 3.6 MMOL/L
PROT SERPL-MCNC: 6.6 G/DL
RBC # BLD: 4.41 M/UL
RBC # FLD: 13.9 %
SODIUM SERPL-SCNC: 142 MMOL/L
TRIGL SERPL-MCNC: 118 MG/DL
TSH SERPL-ACNC: 0.9 UIU/ML
WBC # FLD AUTO: 6.24 K/UL

## 2019-06-20 NOTE — PATIENT PROFILE ADULT. - TEACHING/LEARNING FACTORS IMPACT ABILITY TO LEARN
He is been feeling really well following his septoplasty and turbinate reduction. Exam:  Splints removed and debris cleared from the passages. Airway patent bilaterally    Assessment/plan:  Doing really well following his surgery.   Recommend continue n none

## 2019-08-02 ENCOUNTER — APPOINTMENT (OUTPATIENT)
Dept: CARDIOLOGY | Facility: CLINIC | Age: 64
End: 2019-08-02
Payer: COMMERCIAL

## 2019-08-02 ENCOUNTER — NON-APPOINTMENT (OUTPATIENT)
Age: 64
End: 2019-08-02

## 2019-08-02 VITALS
HEIGHT: 60 IN | HEART RATE: 65 BPM | WEIGHT: 146 LBS | BODY MASS INDEX: 28.66 KG/M2 | SYSTOLIC BLOOD PRESSURE: 126 MMHG | DIASTOLIC BLOOD PRESSURE: 79 MMHG | TEMPERATURE: 97.5 F | OXYGEN SATURATION: 98 %

## 2019-08-02 PROCEDURE — 99214 OFFICE O/P EST MOD 30 MIN: CPT

## 2019-08-04 NOTE — PHYSICAL EXAM
[General Appearance - Well Developed] : well developed [Normal Appearance] : normal appearance [Well Groomed] : well groomed [General Appearance - Well Nourished] : well nourished [No Deformities] : no deformities [General Appearance - In No Acute Distress] : no acute distress [Normal Conjunctiva] : the conjunctiva exhibited no abnormalities [Eyelids - No Xanthelasma] : the eyelids demonstrated no xanthelasmas [Normal Oral Mucosa] : normal oral mucosa [No Oral Pallor] : no oral pallor [No Oral Cyanosis] : no oral cyanosis [Normal Jugular Venous A Waves Present] : normal jugular venous A waves present [Normal Jugular Venous V Waves Present] : normal jugular venous V waves present [No Jugular Venous Guadalupe A Waves] : no jugular venous guadalupe A waves [Respiration, Rhythm And Depth] : normal respiratory rhythm and effort [Exaggerated Use Of Accessory Muscles For Inspiration] : no accessory muscle use [Auscultation Breath Sounds / Voice Sounds] : lungs were clear to auscultation bilaterally [Heart Rate And Rhythm] : heart rate and rhythm were normal [Heart Sounds] : normal S1 and S2 [Murmurs] : no murmurs present [Abdomen Soft] : soft [Abdomen Tenderness] : non-tender [Abdomen Mass (___ Cm)] : no abdominal mass palpated [Abnormal Walk] : normal gait [Gait - Sufficient For Exercise Testing] : the gait was sufficient for exercise testing [Nail Clubbing] : no clubbing of the fingernails [Cyanosis, Localized] : no localized cyanosis [Petechial Hemorrhages (___cm)] : no petechial hemorrhages [Skin Color & Pigmentation] : normal skin color and pigmentation [] : no rash [No Venous Stasis] : no venous stasis [Skin Lesions] : no skin lesions [No Skin Ulcers] : no skin ulcer [No Xanthoma] : no  xanthoma was observed [Oriented To Time, Place, And Person] : oriented to person, place, and time [Affect] : the affect was normal [Mood] : the mood was normal [No Anxiety] : not feeling anxious

## 2019-08-05 LAB
CHOLEST SERPL-MCNC: 208 MG/DL
CHOLEST/HDLC SERPL: 3 RATIO
CRP SERPL-MCNC: 1.6 MG/DL
ERYTHROCYTE [SEDIMENTATION RATE] IN BLOOD BY WESTERGREN METHOD: 35 MM/HR
ESTIMATED AVERAGE GLUCOSE: 120 MG/DL
HBA1C MFR BLD HPLC: 5.8 %
HDLC SERPL-MCNC: 70 MG/DL
LDLC SERPL CALC-MCNC: 114 MG/DL
TRIGL SERPL-MCNC: 120 MG/DL

## 2019-08-05 NOTE — REASON FOR VISIT
[FreeTextEntry1] : PERNELL VIERA returns for follow up evaluation.  Since her last visit in May 2019, she has required hospitalization for SBO which was conservatively managed.  She is now looking for a new gastroenterologist.\par \par From the cardiac standpoint, she reports remaining clinically stable without active complaints.  Her previous workup was last August, when her echocardiogram and nuclear stress test were unremarkable.  \par \par She still complains about having GERD.. \par \par 12-lead ECG shows SR.  Physical examination was unremarkable.  \par \par Losing hair -- wants referral to dermatologist.\par \par At this time, will recommend that she continue with her current cardiovascular medical regimen.

## 2019-08-05 NOTE — REVIEW OF SYSTEMS
[Recent Weight Gain (___ Lbs)] : recent [unfilled] ~Ulb weight gain [Feeling Fatigued] : feeling fatigued [Negative] : Heme/Lymph [Shortness Of Breath] : no shortness of breath [Chest  Pressure] : no chest pressure [Dyspnea on exertion] : not dyspnea during exertion [Chest Pain] : no chest pain [Lower Ext Edema] : no extremity edema [Palpitations] : no palpitations

## 2019-08-16 ENCOUNTER — APPOINTMENT (OUTPATIENT)
Dept: CARDIOLOGY | Facility: CLINIC | Age: 64
End: 2019-08-16
Payer: COMMERCIAL

## 2019-08-16 PROCEDURE — 93306 TTE W/DOPPLER COMPLETE: CPT

## 2019-11-29 ENCOUNTER — APPOINTMENT (OUTPATIENT)
Dept: PULMONOLOGY | Facility: CLINIC | Age: 64
End: 2019-11-29

## 2019-12-06 ENCOUNTER — NON-APPOINTMENT (OUTPATIENT)
Age: 64
End: 2019-12-06

## 2019-12-06 ENCOUNTER — APPOINTMENT (OUTPATIENT)
Dept: CARDIOLOGY | Facility: CLINIC | Age: 64
End: 2019-12-06
Payer: COMMERCIAL

## 2019-12-06 VITALS
BODY MASS INDEX: 29.49 KG/M2 | HEART RATE: 75 BPM | DIASTOLIC BLOOD PRESSURE: 70 MMHG | SYSTOLIC BLOOD PRESSURE: 110 MMHG | OXYGEN SATURATION: 97 % | WEIGHT: 151 LBS

## 2019-12-06 PROCEDURE — 36415 COLL VENOUS BLD VENIPUNCTURE: CPT

## 2019-12-06 PROCEDURE — 93000 ELECTROCARDIOGRAM COMPLETE: CPT

## 2019-12-06 PROCEDURE — 99214 OFFICE O/P EST MOD 30 MIN: CPT

## 2019-12-09 LAB
CHOLEST SERPL-MCNC: 192 MG/DL
CHOLEST/HDLC SERPL: 2.7 RATIO
ESTIMATED AVERAGE GLUCOSE: 123 MG/DL
HBA1C MFR BLD HPLC: 5.9 %
HDLC SERPL-MCNC: 71 MG/DL
LDLC SERPL CALC-MCNC: 99 MG/DL
TRIGL SERPL-MCNC: 109 MG/DL

## 2019-12-18 ENCOUNTER — OTHER (OUTPATIENT)
Age: 64
End: 2019-12-18

## 2019-12-20 ENCOUNTER — NON-APPOINTMENT (OUTPATIENT)
Age: 64
End: 2019-12-20

## 2019-12-20 ENCOUNTER — APPOINTMENT (OUTPATIENT)
Dept: CARDIOLOGY | Facility: CLINIC | Age: 64
End: 2019-12-20
Payer: COMMERCIAL

## 2019-12-20 VITALS
WEIGHT: 152 LBS | OXYGEN SATURATION: 96 % | TEMPERATURE: 98.1 F | BODY MASS INDEX: 29.84 KG/M2 | RESPIRATION RATE: 17 BRPM | DIASTOLIC BLOOD PRESSURE: 78 MMHG | HEART RATE: 68 BPM | SYSTOLIC BLOOD PRESSURE: 133 MMHG | HEIGHT: 60 IN

## 2019-12-20 PROCEDURE — 93000 ELECTROCARDIOGRAM COMPLETE: CPT

## 2019-12-20 PROCEDURE — 99215 OFFICE O/P EST HI 40 MIN: CPT

## 2019-12-20 PROCEDURE — 36415 COLL VENOUS BLD VENIPUNCTURE: CPT

## 2019-12-23 LAB
ALBUMIN SERPL ELPH-MCNC: 4.4 G/DL
ALP BLD-CCNC: 74 U/L
ALT SERPL-CCNC: 21 U/L
ANION GAP SERPL CALC-SCNC: 19 MMOL/L
AST SERPL-CCNC: 17 U/L
BASOPHILS # BLD AUTO: 0.05 K/UL
BASOPHILS NFR BLD AUTO: 0.8 %
BILIRUB SERPL-MCNC: 0.2 MG/DL
BUN SERPL-MCNC: 20 MG/DL
CALCIUM SERPL-MCNC: 9.4 MG/DL
CHLORIDE SERPL-SCNC: 102 MMOL/L
CO2 SERPL-SCNC: 22 MMOL/L
CREAT SERPL-MCNC: 0.63 MG/DL
EOSINOPHIL # BLD AUTO: 0.11 K/UL
EOSINOPHIL NFR BLD AUTO: 1.7 %
GLUCOSE SERPL-MCNC: 93 MG/DL
HCT VFR BLD CALC: 40.6 %
HGB BLD-MCNC: 12.8 G/DL
IMM GRANULOCYTES NFR BLD AUTO: 0.6 %
LYMPHOCYTES # BLD AUTO: 2.84 K/UL
LYMPHOCYTES NFR BLD AUTO: 43.5 %
MAGNESIUM SERPL-MCNC: 1.9 MG/DL
MAN DIFF?: NORMAL
MCHC RBC-ENTMCNC: 28 PG
MCHC RBC-ENTMCNC: 31.5 GM/DL
MCV RBC AUTO: 88.8 FL
MONOCYTES # BLD AUTO: 0.32 K/UL
MONOCYTES NFR BLD AUTO: 4.9 %
NEUTROPHILS # BLD AUTO: 3.17 K/UL
NEUTROPHILS NFR BLD AUTO: 48.5 %
PLATELET # BLD AUTO: 242 K/UL
POTASSIUM SERPL-SCNC: 3.8 MMOL/L
PROT SERPL-MCNC: 6.5 G/DL
RBC # BLD: 4.57 M/UL
RBC # FLD: 13.7 %
SODIUM SERPL-SCNC: 142 MMOL/L
TSH SERPL-ACNC: 0.72 UIU/ML
WBC # FLD AUTO: 6.53 K/UL

## 2019-12-31 ENCOUNTER — APPOINTMENT (OUTPATIENT)
Dept: CARDIOLOGY | Facility: CLINIC | Age: 64
End: 2019-12-31
Payer: COMMERCIAL

## 2019-12-31 PROCEDURE — 78452 HT MUSCLE IMAGE SPECT MULT: CPT

## 2019-12-31 PROCEDURE — 93015 CV STRESS TEST SUPVJ I&R: CPT

## 2019-12-31 PROCEDURE — A9500: CPT

## 2020-01-23 RX ORDER — APIXABAN 5 MG/1
5 TABLET, FILM COATED ORAL
Qty: 180 | Refills: 3 | Status: DISCONTINUED | COMMUNITY
Start: 2020-01-23 | End: 2020-01-23

## 2020-01-24 ENCOUNTER — OUTPATIENT (OUTPATIENT)
Dept: OUTPATIENT SERVICES | Facility: HOSPITAL | Age: 65
LOS: 1 days | End: 2020-01-24
Payer: COMMERCIAL

## 2020-01-24 ENCOUNTER — NON-APPOINTMENT (OUTPATIENT)
Age: 65
End: 2020-01-24

## 2020-01-24 ENCOUNTER — APPOINTMENT (OUTPATIENT)
Dept: CARDIOLOGY | Facility: CLINIC | Age: 65
End: 2020-01-24
Payer: COMMERCIAL

## 2020-01-24 VITALS
SYSTOLIC BLOOD PRESSURE: 134 MMHG | DIASTOLIC BLOOD PRESSURE: 76 MMHG | WEIGHT: 153 LBS | HEART RATE: 63 BPM | BODY MASS INDEX: 30.04 KG/M2 | OXYGEN SATURATION: 98 % | HEIGHT: 60 IN

## 2020-01-24 DIAGNOSIS — Z98.890 OTHER SPECIFIED POSTPROCEDURAL STATES: Chronic | ICD-10-CM

## 2020-01-24 DIAGNOSIS — R00.2 PALPITATIONS: ICD-10-CM

## 2020-01-24 PROCEDURE — 99215 OFFICE O/P EST HI 40 MIN: CPT

## 2020-01-24 PROCEDURE — 93227 XTRNL ECG REC<48 HR R&I: CPT

## 2020-01-24 PROCEDURE — 93000 ELECTROCARDIOGRAM COMPLETE: CPT

## 2020-01-31 ENCOUNTER — NON-APPOINTMENT (OUTPATIENT)
Age: 65
End: 2020-01-31

## 2020-02-07 ENCOUNTER — NON-APPOINTMENT (OUTPATIENT)
Age: 65
End: 2020-02-07

## 2020-02-07 ENCOUNTER — APPOINTMENT (OUTPATIENT)
Dept: ELECTROPHYSIOLOGY | Facility: CLINIC | Age: 65
End: 2020-02-07

## 2020-06-03 ENCOUNTER — TRANSCRIPTION ENCOUNTER (OUTPATIENT)
Age: 65
End: 2020-06-03

## 2020-06-19 ENCOUNTER — NON-APPOINTMENT (OUTPATIENT)
Age: 65
End: 2020-06-19

## 2020-06-19 ENCOUNTER — APPOINTMENT (OUTPATIENT)
Dept: CARDIOLOGY | Facility: CLINIC | Age: 65
End: 2020-06-19
Payer: MEDICARE

## 2020-06-19 VITALS
BODY MASS INDEX: 29.69 KG/M2 | WEIGHT: 152 LBS | DIASTOLIC BLOOD PRESSURE: 80 MMHG | TEMPERATURE: 97 F | HEART RATE: 76 BPM | SYSTOLIC BLOOD PRESSURE: 138 MMHG | OXYGEN SATURATION: 98 %

## 2020-06-19 PROCEDURE — 99214 OFFICE O/P EST MOD 30 MIN: CPT

## 2020-06-19 PROCEDURE — 36415 COLL VENOUS BLD VENIPUNCTURE: CPT

## 2020-06-19 PROCEDURE — 93000 ELECTROCARDIOGRAM COMPLETE: CPT

## 2020-06-19 NOTE — PHYSICAL EXAM
[Normal Appearance] : normal appearance [General Appearance - Well Developed] : well developed [General Appearance - Well Nourished] : well nourished [No Deformities] : no deformities [Well Groomed] : well groomed [General Appearance - In No Acute Distress] : no acute distress [Normal Conjunctiva] : the conjunctiva exhibited no abnormalities [No Oral Pallor] : no oral pallor [Normal Oral Mucosa] : normal oral mucosa [Eyelids - No Xanthelasma] : the eyelids demonstrated no xanthelasmas [Normal Jugular Venous A Waves Present] : normal jugular venous A waves present [No Oral Cyanosis] : no oral cyanosis [Normal Jugular Venous V Waves Present] : normal jugular venous V waves present [No Jugular Venous Guadalupe A Waves] : no jugular venous guadalupe A waves [Respiration, Rhythm And Depth] : normal respiratory rhythm and effort [Exaggerated Use Of Accessory Muscles For Inspiration] : no accessory muscle use [Auscultation Breath Sounds / Voice Sounds] : lungs were clear to auscultation bilaterally [Heart Rate And Rhythm] : heart rate and rhythm were normal [Heart Sounds] : normal S1 and S2 [Murmurs] : no murmurs present [Abdomen Soft] : soft [Abdomen Tenderness] : non-tender [Abdomen Mass (___ Cm)] : no abdominal mass palpated [Abnormal Walk] : normal gait [Gait - Sufficient For Exercise Testing] : the gait was sufficient for exercise testing [Nail Clubbing] : no clubbing of the fingernails [Cyanosis, Localized] : no localized cyanosis [Petechial Hemorrhages (___cm)] : no petechial hemorrhages [No Venous Stasis] : no venous stasis [Skin Color & Pigmentation] : normal skin color and pigmentation [] : no ischemic changes [No Skin Ulcers] : no skin ulcer [No Xanthoma] : no  xanthoma was observed [Skin Lesions] : no skin lesions [Oriented To Time, Place, And Person] : oriented to person, place, and time [Affect] : the affect was normal [Mood] : the mood was normal [No Anxiety] : not feeling anxious

## 2020-06-19 NOTE — REASON FOR VISIT
[FreeTextEntry1] : PERNELL VIERA returns for follow up evaluation.  Since her last visit in January 2020 which was urgent for symptoms of palpitations likely due to PVCs, she has since improved clinically and no longer has complaints of palpitations.  She also has no other cardiac complaints at this time.  Stress test from 12/2019 did not demonstrate that presence of ischemia.  Reports compliance with her medications.  12-lead ECG shows SR.  BPs better controlled on current regimen.  Physical examination was unremarkable.   No further cardiac testing needed at this time.  Will check labs.  F/U in 6 months.\par \par

## 2020-06-19 NOTE — REVIEW OF SYSTEMS
[Negative] : Psychiatric [Recent Weight Gain (___ Lbs)] : no recent weight gain [Dyspnea on exertion] : not dyspnea during exertion [Shortness Of Breath] : no shortness of breath [Feeling Fatigued] : not feeling fatigued [Lower Ext Edema] : no extremity edema [Chest  Pressure] : no chest pressure [Chest Pain] : no chest pain [Palpitations] : no palpitations

## 2020-06-20 LAB
ALBUMIN SERPL ELPH-MCNC: 4.4 G/DL
ALP BLD-CCNC: 70 U/L
ALT SERPL-CCNC: 15 U/L
ANION GAP SERPL CALC-SCNC: 11 MMOL/L
AST SERPL-CCNC: 12 U/L
BASOPHILS # BLD AUTO: 0.06 K/UL
BASOPHILS NFR BLD AUTO: 0.7 %
BILIRUB SERPL-MCNC: 0.2 MG/DL
BUN SERPL-MCNC: 17 MG/DL
CALCIUM SERPL-MCNC: 9.9 MG/DL
CHLORIDE SERPL-SCNC: 105 MMOL/L
CHOLEST SERPL-MCNC: 170 MG/DL
CHOLEST/HDLC SERPL: 2.7 RATIO
CO2 SERPL-SCNC: 27 MMOL/L
CREAT SERPL-MCNC: 0.69 MG/DL
EOSINOPHIL # BLD AUTO: 0.16 K/UL
EOSINOPHIL NFR BLD AUTO: 1.9 %
ESTIMATED AVERAGE GLUCOSE: 114 MG/DL
GLUCOSE SERPL-MCNC: 76 MG/DL
HBA1C MFR BLD HPLC: 5.6 %
HCT VFR BLD CALC: 39.1 %
HDLC SERPL-MCNC: 62 MG/DL
HGB BLD-MCNC: 12.1 G/DL
IMM GRANULOCYTES NFR BLD AUTO: 0.8 %
LDLC SERPL CALC-MCNC: 76 MG/DL
LYMPHOCYTES # BLD AUTO: 3.11 K/UL
LYMPHOCYTES NFR BLD AUTO: 36.1 %
MAN DIFF?: NORMAL
MCHC RBC-ENTMCNC: 28.1 PG
MCHC RBC-ENTMCNC: 30.9 GM/DL
MCV RBC AUTO: 90.9 FL
MONOCYTES # BLD AUTO: 0.44 K/UL
MONOCYTES NFR BLD AUTO: 5.1 %
NEUTROPHILS # BLD AUTO: 4.78 K/UL
NEUTROPHILS NFR BLD AUTO: 55.4 %
PLATELET # BLD AUTO: 234 K/UL
POTASSIUM SERPL-SCNC: 3.8 MMOL/L
PROT SERPL-MCNC: 6.3 G/DL
RBC # BLD: 4.3 M/UL
RBC # FLD: 14 %
SODIUM SERPL-SCNC: 143 MMOL/L
TRIGL SERPL-MCNC: 157 MG/DL
TSH SERPL-ACNC: 1.26 UIU/ML
WBC # FLD AUTO: 8.62 K/UL

## 2020-07-07 ENCOUNTER — TRANSCRIPTION ENCOUNTER (OUTPATIENT)
Age: 65
End: 2020-07-07

## 2020-09-16 ENCOUNTER — TRANSCRIPTION ENCOUNTER (OUTPATIENT)
Age: 65
End: 2020-09-16

## 2020-12-18 ENCOUNTER — APPOINTMENT (OUTPATIENT)
Dept: CARDIOLOGY | Facility: CLINIC | Age: 65
End: 2020-12-18
Payer: MEDICARE

## 2020-12-18 ENCOUNTER — NON-APPOINTMENT (OUTPATIENT)
Age: 65
End: 2020-12-18

## 2020-12-18 VITALS
TEMPERATURE: 97.3 F | WEIGHT: 148 LBS | SYSTOLIC BLOOD PRESSURE: 106 MMHG | DIASTOLIC BLOOD PRESSURE: 70 MMHG | BODY MASS INDEX: 28.9 KG/M2 | OXYGEN SATURATION: 98 % | HEART RATE: 72 BPM

## 2020-12-18 PROCEDURE — 93000 ELECTROCARDIOGRAM COMPLETE: CPT

## 2020-12-18 PROCEDURE — 99214 OFFICE O/P EST MOD 30 MIN: CPT

## 2020-12-18 PROCEDURE — 36415 COLL VENOUS BLD VENIPUNCTURE: CPT

## 2020-12-18 RX ORDER — ESOMEPRAZOLE MAGNESIUM 40 MG/1
40 CAPSULE, DELAYED RELEASE ORAL TWICE DAILY
Qty: 180 | Refills: 3 | Status: DISCONTINUED | COMMUNITY
Start: 2019-05-03 | End: 2020-12-18

## 2020-12-18 RX ORDER — FAMOTIDINE/CA CARB/MAG HYDROX 10-800-165
10-800-165 TABLET,CHEWABLE ORAL
Qty: 90 | Refills: 0 | Status: ACTIVE | COMMUNITY
Start: 2020-12-18 | End: 1900-01-01

## 2020-12-18 RX ORDER — RIVAROXABAN 20 MG/1
20 TABLET, FILM COATED ORAL
Qty: 90 | Refills: 3 | Status: DISCONTINUED | COMMUNITY
Start: 2020-01-23 | End: 2020-12-18

## 2020-12-22 LAB
25(OH)D3 SERPL-MCNC: 28 NG/ML
ALBUMIN SERPL ELPH-MCNC: 4.9 G/DL
ALP BLD-CCNC: 81 U/L
ALT SERPL-CCNC: 20 U/L
ANION GAP SERPL CALC-SCNC: 12 MMOL/L
AST SERPL-CCNC: 15 U/L
BASOPHILS # BLD AUTO: 0.05 K/UL
BASOPHILS NFR BLD AUTO: 0.6 %
BILIRUB SERPL-MCNC: 0.3 MG/DL
BUN SERPL-MCNC: 22 MG/DL
CALCIUM SERPL-MCNC: 10.4 MG/DL
CHLORIDE SERPL-SCNC: 102 MMOL/L
CHOLEST SERPL-MCNC: 163 MG/DL
CO2 SERPL-SCNC: 29 MMOL/L
CREAT SERPL-MCNC: 0.75 MG/DL
EOSINOPHIL # BLD AUTO: 0.16 K/UL
EOSINOPHIL NFR BLD AUTO: 1.9 %
ESTIMATED AVERAGE GLUCOSE: 114 MG/DL
GLUCOSE SERPL-MCNC: 96 MG/DL
HBA1C MFR BLD HPLC: 5.6 %
HCT VFR BLD CALC: 43.1 %
HDLC SERPL-MCNC: 72 MG/DL
HGB BLD-MCNC: 13.5 G/DL
IMM GRANULOCYTES NFR BLD AUTO: 0.4 %
LDLC SERPL CALC-MCNC: 65 MG/DL
LYMPHOCYTES # BLD AUTO: 3.31 K/UL
LYMPHOCYTES NFR BLD AUTO: 39.8 %
MAN DIFF?: NORMAL
MCHC RBC-ENTMCNC: 28 PG
MCHC RBC-ENTMCNC: 31.3 GM/DL
MCV RBC AUTO: 89.2 FL
MONOCYTES # BLD AUTO: 0.43 K/UL
MONOCYTES NFR BLD AUTO: 5.2 %
NEUTROPHILS # BLD AUTO: 4.34 K/UL
NEUTROPHILS NFR BLD AUTO: 52.1 %
NONHDLC SERPL-MCNC: 91 MG/DL
PLATELET # BLD AUTO: 265 K/UL
POTASSIUM SERPL-SCNC: 3.9 MMOL/L
PROT SERPL-MCNC: 7 G/DL
RBC # BLD: 4.83 M/UL
RBC # FLD: 13.4 %
SODIUM SERPL-SCNC: 143 MMOL/L
TRIGL SERPL-MCNC: 134 MG/DL
TSH SERPL-ACNC: 0.75 UIU/ML
WBC # FLD AUTO: 8.32 K/UL

## 2021-01-29 ENCOUNTER — APPOINTMENT (OUTPATIENT)
Dept: INTERNAL MEDICINE | Facility: CLINIC | Age: 66
End: 2021-01-29
Payer: MEDICARE

## 2021-01-29 VITALS
HEIGHT: 60 IN | SYSTOLIC BLOOD PRESSURE: 130 MMHG | BODY MASS INDEX: 29.25 KG/M2 | HEART RATE: 71 BPM | TEMPERATURE: 97.8 F | DIASTOLIC BLOOD PRESSURE: 80 MMHG | WEIGHT: 149 LBS | OXYGEN SATURATION: 98 %

## 2021-01-29 DIAGNOSIS — Z87.898 PERSONAL HISTORY OF OTHER SPECIFIED CONDITIONS: ICD-10-CM

## 2021-01-29 PROCEDURE — 99203 OFFICE O/P NEW LOW 30 MIN: CPT

## 2021-01-30 NOTE — PHYSICAL EXAM
[No Acute Distress] : no acute distress [Well Nourished] : well nourished [PERRL] : pupils equal round and reactive to light [EOMI] : extraocular movements intact [No Accessory Muscle Use] : no accessory muscle use [Clear to Auscultation] : lungs were clear to auscultation bilaterally [Regular Rhythm] : with a regular rhythm [Normal S1, S2] : normal S1 and S2 [No Murmur] : no murmur heard [No Edema] : there was no peripheral edema [Soft] : abdomen soft [Non Tender] : non-tender [Non-distended] : non-distended [No Masses] : no abdominal mass palpated [No HSM] : no HSM [Normal Bowel Sounds] : normal bowel sounds [No Spinal Tenderness] : no spinal tenderness [Normal] : the cranial nerves were intact [Sensation Tactile Decrease] : light touch was intact [2+] : left 2+ [Normal Affect] : the affect was normal [Normal Insight/Judgement] : insight and judgment were intact

## 2021-02-02 NOTE — HISTORY OF PRESENT ILLNESS
[FreeTextEntry8] : cc: here to establish care \par \par 1 week of posterior ha - has relief with tylenol - pain maximum is 7/10 \par 1.5 years ago states saw neurologist Dr. Fox and reports had a normal brain MRI \par \par has seen GI Dr. Hernandez for heartburn - on pepcid complete

## 2021-02-19 ENCOUNTER — TRANSCRIPTION ENCOUNTER (OUTPATIENT)
Age: 66
End: 2021-02-19

## 2021-02-20 ENCOUNTER — NON-APPOINTMENT (OUTPATIENT)
Age: 66
End: 2021-02-20

## 2021-03-26 ENCOUNTER — NON-APPOINTMENT (OUTPATIENT)
Age: 66
End: 2021-03-26

## 2021-03-29 ENCOUNTER — APPOINTMENT (OUTPATIENT)
Dept: INTERNAL MEDICINE | Facility: CLINIC | Age: 66
End: 2021-03-29
Payer: MEDICARE

## 2021-03-29 ENCOUNTER — LABORATORY RESULT (OUTPATIENT)
Age: 66
End: 2021-03-29

## 2021-03-29 ENCOUNTER — NON-APPOINTMENT (OUTPATIENT)
Age: 66
End: 2021-03-29

## 2021-03-29 VITALS
SYSTOLIC BLOOD PRESSURE: 120 MMHG | HEIGHT: 60 IN | HEART RATE: 72 BPM | BODY MASS INDEX: 28.47 KG/M2 | TEMPERATURE: 97.7 F | OXYGEN SATURATION: 98 % | DIASTOLIC BLOOD PRESSURE: 80 MMHG | WEIGHT: 145 LBS

## 2021-03-29 DIAGNOSIS — Z84.89 FAMILY HISTORY OF OTHER SPECIFIED CONDITIONS: ICD-10-CM

## 2021-03-29 DIAGNOSIS — H81.10 BENIGN PAROXYSMAL VERTIGO, UNSPECIFIED EAR: ICD-10-CM

## 2021-03-29 PROCEDURE — 36415 COLL VENOUS BLD VENIPUNCTURE: CPT

## 2021-03-29 PROCEDURE — 93000 ELECTROCARDIOGRAM COMPLETE: CPT

## 2021-03-29 PROCEDURE — 99214 OFFICE O/P EST MOD 30 MIN: CPT | Mod: 25

## 2021-03-30 ENCOUNTER — TRANSCRIPTION ENCOUNTER (OUTPATIENT)
Age: 66
End: 2021-03-30

## 2021-03-30 ENCOUNTER — NON-APPOINTMENT (OUTPATIENT)
Age: 66
End: 2021-03-30

## 2021-03-30 PROBLEM — H81.10 BPPV (BENIGN PAROXYSMAL POSITIONAL VERTIGO): Status: ACTIVE | Noted: 2021-03-30

## 2021-03-30 LAB
APPEARANCE: CLEAR
BASOPHILS # BLD AUTO: 0.06 K/UL
BASOPHILS NFR BLD AUTO: 0.7 %
BILIRUBIN URINE: NEGATIVE
BLOOD URINE: NORMAL
COLOR: YELLOW
EOSINOPHIL # BLD AUTO: 0.18 K/UL
EOSINOPHIL NFR BLD AUTO: 2 %
GLUCOSE QUALITATIVE U: NEGATIVE
HCT VFR BLD CALC: 39.6 %
HGB BLD-MCNC: 12.8 G/DL
IMM GRANULOCYTES NFR BLD AUTO: 0.4 %
KETONES URINE: NEGATIVE
LEUKOCYTE ESTERASE URINE: ABNORMAL
LYMPHOCYTES # BLD AUTO: 3.69 K/UL
LYMPHOCYTES NFR BLD AUTO: 40.1 %
MAN DIFF?: NORMAL
MCHC RBC-ENTMCNC: 28.2 PG
MCHC RBC-ENTMCNC: 32.3 GM/DL
MCV RBC AUTO: 87.2 FL
MONOCYTES # BLD AUTO: 0.48 K/UL
MONOCYTES NFR BLD AUTO: 5.2 %
NEUTROPHILS # BLD AUTO: 4.76 K/UL
NEUTROPHILS NFR BLD AUTO: 51.6 %
NITRITE URINE: NEGATIVE
PH URINE: 5.5
PLATELET # BLD AUTO: 255 K/UL
PROTEIN URINE: NEGATIVE
RBC # BLD: 4.54 M/UL
RBC # FLD: 13.3 %
SPECIFIC GRAVITY URINE: 1.03
TSH SERPL-ACNC: 1.31 UIU/ML
UROBILINOGEN URINE: NORMAL
WBC # FLD AUTO: 9.21 K/UL

## 2021-03-31 ENCOUNTER — NON-APPOINTMENT (OUTPATIENT)
Age: 66
End: 2021-03-31

## 2021-03-31 LAB
ALBUMIN SERPL ELPH-MCNC: 4.5 G/DL
ALP BLD-CCNC: 72 U/L
ALT SERPL-CCNC: 13 U/L
ANION GAP SERPL CALC-SCNC: 12 MMOL/L
AST SERPL-CCNC: 14 U/L
BILIRUB SERPL-MCNC: 0.2 MG/DL
BUN SERPL-MCNC: 22 MG/DL
CALCIUM SERPL-MCNC: 10.1 MG/DL
CHLORIDE SERPL-SCNC: 103 MMOL/L
CO2 SERPL-SCNC: 29 MMOL/L
CREAT SERPL-MCNC: 0.7 MG/DL
ESTIMATED AVERAGE GLUCOSE: 120 MG/DL
GLUCOSE SERPL-MCNC: 127 MG/DL
HBA1C MFR BLD HPLC: 5.8 %
POTASSIUM SERPL-SCNC: 3.6 MMOL/L
PROT SERPL-MCNC: 6.5 G/DL
SODIUM SERPL-SCNC: 144 MMOL/L

## 2021-04-23 ENCOUNTER — TRANSCRIPTION ENCOUNTER (OUTPATIENT)
Age: 66
End: 2021-04-23

## 2021-04-23 ENCOUNTER — NON-APPOINTMENT (OUTPATIENT)
Age: 66
End: 2021-04-23

## 2021-05-06 ENCOUNTER — TRANSCRIPTION ENCOUNTER (OUTPATIENT)
Age: 66
End: 2021-05-06

## 2021-05-07 ENCOUNTER — TRANSCRIPTION ENCOUNTER (OUTPATIENT)
Age: 66
End: 2021-05-07

## 2021-05-24 ENCOUNTER — NON-APPOINTMENT (OUTPATIENT)
Age: 66
End: 2021-05-24

## 2021-05-28 ENCOUNTER — NON-APPOINTMENT (OUTPATIENT)
Age: 66
End: 2021-05-28

## 2021-05-28 ENCOUNTER — APPOINTMENT (OUTPATIENT)
Dept: INTERNAL MEDICINE | Facility: CLINIC | Age: 66
End: 2021-05-28
Payer: MEDICARE

## 2021-05-28 VITALS
BODY MASS INDEX: 28.86 KG/M2 | TEMPERATURE: 97.5 F | HEIGHT: 60 IN | OXYGEN SATURATION: 98 % | HEART RATE: 75 BPM | SYSTOLIC BLOOD PRESSURE: 120 MMHG | WEIGHT: 147 LBS | DIASTOLIC BLOOD PRESSURE: 60 MMHG

## 2021-05-28 PROCEDURE — 99213 OFFICE O/P EST LOW 20 MIN: CPT | Mod: 25

## 2021-05-28 PROCEDURE — 36415 COLL VENOUS BLD VENIPUNCTURE: CPT

## 2021-05-28 PROCEDURE — 93000 ELECTROCARDIOGRAM COMPLETE: CPT

## 2021-05-29 LAB
BASOPHILS # BLD AUTO: 0.04 K/UL
BASOPHILS NFR BLD AUTO: 0.6 %
EOSINOPHIL # BLD AUTO: 0.1 K/UL
EOSINOPHIL NFR BLD AUTO: 1.5 %
HCT VFR BLD CALC: 40.9 %
HGB BLD-MCNC: 13.1 G/DL
IMM GRANULOCYTES NFR BLD AUTO: 0.6 %
LYMPHOCYTES # BLD AUTO: 2.75 K/UL
LYMPHOCYTES NFR BLD AUTO: 41.4 %
MAN DIFF?: NORMAL
MCHC RBC-ENTMCNC: 28.4 PG
MCHC RBC-ENTMCNC: 32 GM/DL
MCV RBC AUTO: 88.7 FL
MEV IGG FLD QL IA: >300 AU/ML
MEV IGG+IGM SER-IMP: POSITIVE
MONOCYTES # BLD AUTO: 0.37 K/UL
MONOCYTES NFR BLD AUTO: 5.6 %
MUV AB SER-ACNC: POSITIVE
MUV IGG SER QL IA: 161 AU/ML
NEUTROPHILS # BLD AUTO: 3.35 K/UL
NEUTROPHILS NFR BLD AUTO: 50.3 %
PLATELET # BLD AUTO: 250 K/UL
RBC # BLD: 4.61 M/UL
RBC # FLD: 13.8 %
RUBV IGG FLD-ACNC: 2.2 INDEX
RUBV IGG SER-IMP: POSITIVE
VZV AB TITR SER: POSITIVE
VZV IGG SER IF-ACNC: 1922 INDEX
WBC # FLD AUTO: 6.65 K/UL

## 2021-06-01 LAB
ALBUMIN SERPL ELPH-MCNC: 4.6 G/DL
ALP BLD-CCNC: 73 U/L
ALT SERPL-CCNC: 16 U/L
ANION GAP SERPL CALC-SCNC: 16 MMOL/L
AST SERPL-CCNC: 16 U/L
BILIRUB SERPL-MCNC: 0.2 MG/DL
BUN SERPL-MCNC: 20 MG/DL
CALCIUM SERPL-MCNC: 10.3 MG/DL
CHLORIDE SERPL-SCNC: 103 MMOL/L
CO2 SERPL-SCNC: 28 MMOL/L
CREAT SERPL-MCNC: 0.68 MG/DL
GLUCOSE SERPL-MCNC: 92 MG/DL
MAGNESIUM SERPL-MCNC: 1.8 MG/DL
POTASSIUM SERPL-SCNC: 4.1 MMOL/L
PROT SERPL-MCNC: 6.8 G/DL
SODIUM SERPL-SCNC: 147 MMOL/L
T3 SERPL-MCNC: 119 NG/DL
T4 FREE SERPL-MCNC: 1.2 NG/DL
TSH SERPL-ACNC: 1.04 UIU/ML

## 2021-06-02 ENCOUNTER — NON-APPOINTMENT (OUTPATIENT)
Age: 66
End: 2021-06-02

## 2021-06-04 ENCOUNTER — APPOINTMENT (OUTPATIENT)
Dept: CARDIOLOGY | Facility: CLINIC | Age: 66
End: 2021-06-04
Payer: MEDICARE

## 2021-06-04 VITALS
RESPIRATION RATE: 17 BRPM | DIASTOLIC BLOOD PRESSURE: 76 MMHG | BODY MASS INDEX: 29.45 KG/M2 | OXYGEN SATURATION: 96 % | WEIGHT: 150 LBS | HEART RATE: 77 BPM | TEMPERATURE: 98 F | SYSTOLIC BLOOD PRESSURE: 137 MMHG | HEIGHT: 60 IN

## 2021-06-04 DIAGNOSIS — Z13.6 ENCOUNTER FOR SCREENING FOR CARDIOVASCULAR DISORDERS: ICD-10-CM

## 2021-06-04 DIAGNOSIS — Z87.898 PERSONAL HISTORY OF OTHER SPECIFIED CONDITIONS: ICD-10-CM

## 2021-06-04 DIAGNOSIS — I49.1 ATRIAL PREMATURE DEPOLARIZATION: ICD-10-CM

## 2021-06-04 PROCEDURE — 93000 ELECTROCARDIOGRAM COMPLETE: CPT

## 2021-06-04 PROCEDURE — 99214 OFFICE O/P EST MOD 30 MIN: CPT

## 2021-06-05 PROBLEM — I49.1 APC (ATRIAL PREMATURE CONTRACTIONS): Status: ACTIVE | Noted: 2019-12-24

## 2021-06-05 PROBLEM — Z13.6 SCREENING FOR CARDIOVASCULAR CONDITION: Status: ACTIVE | Noted: 2019-12-24

## 2021-06-08 ENCOUNTER — APPOINTMENT (OUTPATIENT)
Dept: CARDIOLOGY | Facility: CLINIC | Age: 66
End: 2021-06-08
Payer: MEDICARE

## 2021-06-08 PROCEDURE — 93306 TTE W/DOPPLER COMPLETE: CPT

## 2021-07-11 ENCOUNTER — RX RENEWAL (OUTPATIENT)
Age: 66
End: 2021-07-11

## 2021-08-24 DIAGNOSIS — Z87.19 PERSONAL HISTORY OF OTHER DISEASES OF THE DIGESTIVE SYSTEM: ICD-10-CM

## 2021-08-24 DIAGNOSIS — Z86.79 PERSONAL HISTORY OF OTHER DISEASES OF THE CIRCULATORY SYSTEM: ICD-10-CM

## 2021-09-09 ENCOUNTER — RX RENEWAL (OUTPATIENT)
Age: 66
End: 2021-09-09

## 2021-09-27 ENCOUNTER — APPOINTMENT (OUTPATIENT)
Dept: UROGYNECOLOGY | Facility: CLINIC | Age: 66
End: 2021-09-27
Payer: MEDICARE

## 2021-09-27 VITALS
WEIGHT: 147 LBS | HEIGHT: 60 IN | SYSTOLIC BLOOD PRESSURE: 130 MMHG | DIASTOLIC BLOOD PRESSURE: 78 MMHG | BODY MASS INDEX: 28.86 KG/M2 | TEMPERATURE: 96.9 F

## 2021-09-27 DIAGNOSIS — R10.2 PELVIC AND PERINEAL PAIN: ICD-10-CM

## 2021-09-27 PROCEDURE — 99214 OFFICE O/P EST MOD 30 MIN: CPT | Mod: GC

## 2021-09-27 NOTE — HISTORY OF PRESENT ILLNESS
[Cystocele (Obstetric)] : moderate [Uterine Prolapse] : no [Vaginal Wall Prolapse] : no [Rectal Prolapse] : no [Unable To Restrain Bowel Movement] : no [Urinary Frequency] : no [Feelings Of Urinary Urgency] : no [x1] : nocturia once nightly [Urinary Tract Infection] : no [Constipation Obstructed Defecation] : moderate [] : no [de-identified] : 1-2x/d [FreeTextEntry6] : Managing for  [FreeTextEntry1] : \par Pamela is as 67yo with abdominopelvic pain for years, worsening over a week or so. She was last seen in our office on 11/2/18. She presents today for follow up. Pain is described as an ache/pressure. Occurs mostly in the morning. Improved with tylenol and motrin. Pain unrelated to urination, bladder filling, defecation, constipation. Pain is brought on by caffeine intake. \par \par She had a CT abdomen with contrast on 8/19/21 that she reports was normal except for ovarian cysts. \par \par PMH: SBO x4 since CD\par PSH: abdominal myomyectomy, CDx1 with bowel injury requiring resection and reanastamosis, left inguinal hernia repair

## 2021-09-27 NOTE — PHYSICAL EXAM
[Chaperone Present] : A chaperone was present in the examining room during all aspects of the physical examination [No Acute Distress] : in no acute distress [Well developed] : well developed [Well Nourished] : ~L well nourished [Oriented x3] : oriented to person, place, and time [Normal Memory] : ~T memory was ~L unimpaired [Normal Mood/Affect] : mood and affect are normal [Scar] : a scar was noted [None] : no CVA tenderness [Warm and Dry] : was warm and dry to touch [Normal Gait] : gait was normal [Labia Majora] : were normal [Labia Minora] : were normal [Normal Appearance] : general appearance was normal [Atrophy] : atrophy [No Bleeding] : there was no active vaginal bleeding [Soft] :  the cervix was soft [Normal] : no abnormalities [Post Void Residual ____ml] : post void residual was [unfilled] ml [Mass (___ Cm)] : no ~M [unfilled] abdominal mass was palpated [Tenderness] : ~T no ~M abdominal tenderness observed [Distended] : not distended [FreeTextEntry4] : No prolapse

## 2021-09-27 NOTE — DISCUSSION/SUMMARY
[FreeTextEntry1] : \chioma Santiago presents for follow up of abdominal pain, incontinence. We reviewed her symptoms and findings. We discussed that her abdominal pain unlikely to be an urogynecologic issue as she had no pelvic tenderness on exam and her symptoms are unrelated to urination/defecation. We discussed following up with her medical doctor or a general surgeon, given her complex surgical history. I instructed her to go to the ED if she has severe abdominal pain, nausea/vomiting, etc. For her insensible incontinence, we discussed further evaluation with urodynamics testing once a UTI is ruled out. We collected urine and sent for culture.\par \par RTO for urodynamics.

## 2021-10-01 ENCOUNTER — APPOINTMENT (OUTPATIENT)
Dept: INTERNAL MEDICINE | Facility: CLINIC | Age: 66
End: 2021-10-01
Payer: MEDICARE

## 2021-10-01 VITALS
HEIGHT: 60 IN | DIASTOLIC BLOOD PRESSURE: 80 MMHG | WEIGHT: 146 LBS | SYSTOLIC BLOOD PRESSURE: 120 MMHG | BODY MASS INDEX: 28.66 KG/M2

## 2021-10-01 DIAGNOSIS — F43.21 ADJUSTMENT DISORDER WITH DEPRESSED MOOD: ICD-10-CM

## 2021-10-01 DIAGNOSIS — N83.209 UNSPECIFIED OVARIAN CYST, UNSPECIFIED SIDE: ICD-10-CM

## 2021-10-01 DIAGNOSIS — K21.9 GASTRO-ESOPHAGEAL REFLUX DISEASE W/OUT ESOPHAGITIS: ICD-10-CM

## 2021-10-01 DIAGNOSIS — G47.00 INSOMNIA, UNSPECIFIED: ICD-10-CM

## 2021-10-01 DIAGNOSIS — K86.2 CYST OF PANCREAS: ICD-10-CM

## 2021-10-01 DIAGNOSIS — M54.2 CERVICALGIA: ICD-10-CM

## 2021-10-01 LAB
BILIRUB UR QL STRIP: NEGATIVE
CLARITY UR: NORMAL
COLLECTION METHOD: NORMAL
GLUCOSE UR-MCNC: NEGATIVE
HCG UR QL: 0.2 EU/DL
HGB UR QL STRIP.AUTO: ABNORMAL
KETONES UR-MCNC: NEGATIVE
LEUKOCYTE ESTERASE UR QL STRIP: ABNORMAL
NITRITE UR QL STRIP: NEGATIVE
PH UR STRIP: 7.5
PROT UR STRIP-MCNC: NEGATIVE
SP GR UR STRIP: 1.02

## 2021-10-01 PROCEDURE — 99214 OFFICE O/P EST MOD 30 MIN: CPT | Mod: 25

## 2021-10-01 PROCEDURE — 81003 URINALYSIS AUTO W/O SCOPE: CPT | Mod: QW

## 2021-10-01 RX ORDER — DOCUSATE SODIUM 100 MG/1
100 CAPSULE ORAL 3 TIMES DAILY
Qty: 90 | Refills: 1 | Status: ACTIVE | COMMUNITY
Start: 2021-10-01

## 2021-10-01 RX ORDER — ASPIRIN ENTERIC COATED TABLETS 81 MG 81 MG/1
81 TABLET, DELAYED RELEASE ORAL
Qty: 90 | Refills: 1 | Status: ACTIVE | COMMUNITY
Start: 2021-10-01

## 2021-10-02 PROBLEM — F43.21 GRIEVING: Status: RESOLVED | Noted: 2021-03-30 | Resolved: 2021-10-02

## 2021-10-02 PROBLEM — M54.2 CERVICALGIA OF OCCIPITO-ATLANTO-AXIAL REGION: Status: RESOLVED | Noted: 2021-01-30 | Resolved: 2021-10-02

## 2021-10-02 PROBLEM — N83.209 OVARIAN CYST: Status: ACTIVE | Noted: 2021-10-02

## 2021-10-02 PROBLEM — G47.00 ACUTE INSOMNIA: Status: RESOLVED | Noted: 2021-02-20 | Resolved: 2021-10-02

## 2021-10-02 PROBLEM — K86.2 PANCREATIC CYST: Status: ACTIVE | Noted: 2021-10-02

## 2021-10-02 LAB
ALBUMIN SERPL ELPH-MCNC: 4.8 G/DL
ALP BLD-CCNC: 82 U/L
ALT SERPL-CCNC: 18 U/L
ANION GAP SERPL CALC-SCNC: 16 MMOL/L
AST SERPL-CCNC: 17 U/L
BILIRUB SERPL-MCNC: 0.4 MG/DL
BUN SERPL-MCNC: 15 MG/DL
CALCIUM SERPL-MCNC: 10.5 MG/DL
CHLORIDE SERPL-SCNC: 101 MMOL/L
CO2 SERPL-SCNC: 26 MMOL/L
CREAT SERPL-MCNC: 0.75 MG/DL
GLUCOSE SERPL-MCNC: 102 MG/DL
POTASSIUM SERPL-SCNC: 3.9 MMOL/L
PROT SERPL-MCNC: 7.3 G/DL
SODIUM SERPL-SCNC: 143 MMOL/L
TSH SERPL-ACNC: 0.93 UIU/ML

## 2021-10-04 LAB
BASOPHILS # BLD AUTO: 0.05 K/UL
BASOPHILS NFR BLD AUTO: 0.7 %
EOSINOPHIL # BLD AUTO: 0.1 K/UL
EOSINOPHIL NFR BLD AUTO: 1.4 %
HCT VFR BLD CALC: 43.1 %
HGB BLD-MCNC: 14.2 G/DL
IMM GRANULOCYTES NFR BLD AUTO: 0.6 %
LYMPHOCYTES # BLD AUTO: 2.82 K/UL
LYMPHOCYTES NFR BLD AUTO: 39.3 %
MAN DIFF?: NORMAL
MCHC RBC-ENTMCNC: 28.5 PG
MCHC RBC-ENTMCNC: 32.9 GM/DL
MCV RBC AUTO: 86.4 FL
MONOCYTES # BLD AUTO: 0.38 K/UL
MONOCYTES NFR BLD AUTO: 5.3 %
NEUTROPHILS # BLD AUTO: 3.78 K/UL
NEUTROPHILS NFR BLD AUTO: 52.7 %
PLATELET # BLD AUTO: 267 K/UL
RBC # BLD: 4.99 M/UL
RBC # FLD: 13.5 %
WBC # FLD AUTO: 7.17 K/UL

## 2021-10-05 ENCOUNTER — NON-APPOINTMENT (OUTPATIENT)
Age: 66
End: 2021-10-05

## 2021-10-05 LAB
APPEARANCE: CLEAR
BACTERIA UR CULT: NORMAL
BACTERIA: ABNORMAL
BILIRUBIN URINE: NEGATIVE
BLOOD URINE: NEGATIVE
CHOLEST SERPL-MCNC: 201 MG/DL
COLOR: NORMAL
ESTIMATED AVERAGE GLUCOSE: 120 MG/DL
GLUCOSE QUALITATIVE U: NEGATIVE
HBA1C MFR BLD HPLC: 5.8 %
HDLC SERPL-MCNC: 83 MG/DL
HYALINE CASTS: 1 /LPF
KETONES URINE: NEGATIVE
LDLC SERPL CALC-MCNC: 96 MG/DL
LEUKOCYTE ESTERASE URINE: NEGATIVE
MICROSCOPIC-UA: NORMAL
NITRITE URINE: NEGATIVE
NONHDLC SERPL-MCNC: 117 MG/DL
PH URINE: 6.5
PROTEIN URINE: NEGATIVE
RED BLOOD CELLS URINE: 1 /HPF
SPECIFIC GRAVITY URINE: 1.02
SQUAMOUS EPITHELIAL CELLS: 3 /HPF
TRIGL SERPL-MCNC: 104 MG/DL
UROBILINOGEN URINE: NORMAL
WHITE BLOOD CELLS URINE: 2 /HPF

## 2021-10-05 RX ORDER — CYCLOBENZAPRINE HYDROCHLORIDE 10 MG/1
10 TABLET, FILM COATED ORAL EVERY 8 HOURS
Qty: 1 | Refills: 0 | Status: DISCONTINUED | COMMUNITY
Start: 2021-10-01 | End: 2021-10-05

## 2021-10-08 ENCOUNTER — TRANSCRIPTION ENCOUNTER (OUTPATIENT)
Age: 66
End: 2021-10-08

## 2021-10-11 ENCOUNTER — TRANSCRIPTION ENCOUNTER (OUTPATIENT)
Age: 66
End: 2021-10-11

## 2021-10-11 LAB
APPEARANCE: CLEAR
BACTERIA UR CULT: NORMAL
BACTERIA: NEGATIVE
BILIRUBIN URINE: NEGATIVE
BLOOD URINE: NEGATIVE
COLOR: NORMAL
GLUCOSE QUALITATIVE U: NEGATIVE
HYALINE CASTS: 1 /LPF
KETONES URINE: NEGATIVE
LEUKOCYTE ESTERASE URINE: NEGATIVE
MICROSCOPIC-UA: NORMAL
NITRITE URINE: NEGATIVE
PH URINE: 8.5
PROTEIN URINE: NEGATIVE
RED BLOOD CELLS URINE: 2 /HPF
SPECIFIC GRAVITY URINE: 1.02
SQUAMOUS EPITHELIAL CELLS: 1 /HPF
UROBILINOGEN URINE: NORMAL
WHITE BLOOD CELLS URINE: 1 /HPF

## 2021-10-13 ENCOUNTER — TRANSCRIPTION ENCOUNTER (OUTPATIENT)
Age: 66
End: 2021-10-13

## 2021-10-15 ENCOUNTER — TRANSCRIPTION ENCOUNTER (OUTPATIENT)
Age: 66
End: 2021-10-15

## 2021-10-22 ENCOUNTER — RX RENEWAL (OUTPATIENT)
Age: 66
End: 2021-10-22

## 2021-11-03 ENCOUNTER — APPOINTMENT (OUTPATIENT)
Dept: UROGYNECOLOGY | Facility: CLINIC | Age: 66
End: 2021-11-03

## 2021-12-08 ENCOUNTER — TRANSCRIPTION ENCOUNTER (OUTPATIENT)
Age: 66
End: 2021-12-08

## 2021-12-09 ENCOUNTER — TRANSCRIPTION ENCOUNTER (OUTPATIENT)
Age: 66
End: 2021-12-09

## 2022-01-03 ENCOUNTER — TRANSCRIPTION ENCOUNTER (OUTPATIENT)
Age: 67
End: 2022-01-03

## 2022-01-18 ENCOUNTER — TRANSCRIPTION ENCOUNTER (OUTPATIENT)
Age: 67
End: 2022-01-18

## 2022-01-20 ENCOUNTER — TRANSCRIPTION ENCOUNTER (OUTPATIENT)
Age: 67
End: 2022-01-20

## 2022-02-23 RX ORDER — OSELTAMIVIR PHOSPHATE 75 MG/1
75 CAPSULE ORAL
Qty: 7 | Refills: 0 | Status: DISCONTINUED | COMMUNITY
Start: 2021-12-08 | End: 2022-02-23

## 2022-02-25 ENCOUNTER — NON-APPOINTMENT (OUTPATIENT)
Age: 67
End: 2022-02-25

## 2022-02-25 ENCOUNTER — APPOINTMENT (OUTPATIENT)
Dept: INTERNAL MEDICINE | Facility: CLINIC | Age: 67
End: 2022-02-25
Payer: MEDICARE

## 2022-02-25 VITALS
OXYGEN SATURATION: 98 % | SYSTOLIC BLOOD PRESSURE: 130 MMHG | HEIGHT: 60 IN | HEART RATE: 73 BPM | DIASTOLIC BLOOD PRESSURE: 70 MMHG

## 2022-02-25 VITALS — BODY MASS INDEX: 27.6 KG/M2 | HEIGHT: 62 IN | WEIGHT: 150 LBS

## 2022-02-25 DIAGNOSIS — Z20.828 CONTACT WITH AND (SUSPECTED) EXPOSURE TO OTHER VIRAL COMMUNICABLE DISEASES: ICD-10-CM

## 2022-02-25 DIAGNOSIS — R10.9 UNSPECIFIED ABDOMINAL PAIN: ICD-10-CM

## 2022-02-25 DIAGNOSIS — Z87.898 PERSONAL HISTORY OF OTHER SPECIFIED CONDITIONS: ICD-10-CM

## 2022-02-25 DIAGNOSIS — R41.89 OTHER SYMPTOMS AND SIGNS INVOLVING COGNITIVE FUNCTIONS AND AWARENESS: ICD-10-CM

## 2022-02-25 DIAGNOSIS — R00.2 PALPITATIONS: ICD-10-CM

## 2022-02-25 PROCEDURE — 93000 ELECTROCARDIOGRAM COMPLETE: CPT | Mod: 59

## 2022-02-25 PROCEDURE — 99214 OFFICE O/P EST MOD 30 MIN: CPT | Mod: 25

## 2022-02-25 PROCEDURE — 36415 COLL VENOUS BLD VENIPUNCTURE: CPT

## 2022-02-25 RX ORDER — TIZANIDINE 2 MG/1
2 TABLET ORAL EVERY 8 HOURS
Qty: 30 | Refills: 0 | Status: DISCONTINUED | COMMUNITY
Start: 2021-10-05 | End: 2022-02-25

## 2022-02-25 RX ORDER — SERTRALINE 25 MG/1
25 TABLET, FILM COATED ORAL
Qty: 50 | Refills: 0 | Status: DISCONTINUED | COMMUNITY
Start: 2021-06-01 | End: 2022-02-25

## 2022-02-28 PROBLEM — R41.89 SUBJECTIVE MEMORY COMPLAINTS: Status: ACTIVE | Noted: 2021-03-29

## 2022-02-28 PROBLEM — Z87.898 HISTORY OF DYSURIA: Status: RESOLVED | Noted: 2021-01-29 | Resolved: 2022-02-28

## 2022-02-28 PROBLEM — Z87.898 HISTORY OF HEADACHE: Status: RESOLVED | Noted: 2021-03-29 | Resolved: 2022-02-28

## 2022-02-28 PROBLEM — R10.9 ABDOMINAL DISCOMFORT: Status: RESOLVED | Noted: 2021-10-02 | Resolved: 2022-02-28

## 2022-02-28 PROBLEM — Z20.828 EXPOSURE TO THE FLU: Status: RESOLVED | Noted: 2021-12-08 | Resolved: 2022-02-28

## 2022-02-28 LAB
ALBUMIN SERPL ELPH-MCNC: 5 G/DL
ALP BLD-CCNC: 72 U/L
ALT SERPL-CCNC: 22 U/L
ANION GAP SERPL CALC-SCNC: 18 MMOL/L
APPEARANCE: CLEAR
AST SERPL-CCNC: 21 U/L
BASOPHILS # BLD AUTO: 0.04 K/UL
BASOPHILS NFR BLD AUTO: 0.7 %
BILIRUB SERPL-MCNC: 0.3 MG/DL
BILIRUBIN URINE: NEGATIVE
BLOOD URINE: NEGATIVE
BUN SERPL-MCNC: 16 MG/DL
CALCIUM SERPL-MCNC: 10 MG/DL
CHLORIDE SERPL-SCNC: 104 MMOL/L
CO2 SERPL-SCNC: 22 MMOL/L
COLOR: YELLOW
CREAT SERPL-MCNC: 0.69 MG/DL
CRP SERPL-MCNC: 7 MG/L
EOSINOPHIL # BLD AUTO: 0.1 K/UL
EOSINOPHIL NFR BLD AUTO: 1.7 %
ERYTHROCYTE [SEDIMENTATION RATE] IN BLOOD BY WESTERGREN METHOD: 27 MM/HR
ESTIMATED AVERAGE GLUCOSE: 123 MG/DL
GLUCOSE QUALITATIVE U: NEGATIVE
GLUCOSE SERPL-MCNC: 106 MG/DL
HBA1C MFR BLD HPLC: 5.9 %
HCT VFR BLD CALC: 41.2 %
HGB BLD-MCNC: 13 G/DL
IMM GRANULOCYTES NFR BLD AUTO: 0.3 %
KETONES URINE: NEGATIVE
LEUKOCYTE ESTERASE URINE: NEGATIVE
LYMPHOCYTES # BLD AUTO: 2.32 K/UL
LYMPHOCYTES NFR BLD AUTO: 38.6 %
MAN DIFF?: NORMAL
MCHC RBC-ENTMCNC: 27.6 PG
MCHC RBC-ENTMCNC: 31.6 GM/DL
MCV RBC AUTO: 87.5 FL
MONOCYTES # BLD AUTO: 0.34 K/UL
MONOCYTES NFR BLD AUTO: 5.7 %
NEUTROPHILS # BLD AUTO: 3.19 K/UL
NEUTROPHILS NFR BLD AUTO: 53 %
NITRITE URINE: NEGATIVE
PH URINE: 7.5
PLATELET # BLD AUTO: 260 K/UL
POTASSIUM SERPL-SCNC: 4 MMOL/L
PROT SERPL-MCNC: 7 G/DL
PROTEIN URINE: NORMAL
RBC # BLD: 4.71 M/UL
RBC # FLD: 13.4 %
SODIUM SERPL-SCNC: 144 MMOL/L
SPECIFIC GRAVITY URINE: 1.02
TSH SERPL-ACNC: 0.66 UIU/ML
UROBILINOGEN URINE: NORMAL
VIT B12 SERPL-MCNC: >2000 PG/ML
WBC # FLD AUTO: 6.01 K/UL

## 2022-03-08 ENCOUNTER — TRANSCRIPTION ENCOUNTER (OUTPATIENT)
Age: 67
End: 2022-03-08

## 2022-03-08 ENCOUNTER — NON-APPOINTMENT (OUTPATIENT)
Age: 67
End: 2022-03-08

## 2022-03-11 ENCOUNTER — NON-APPOINTMENT (OUTPATIENT)
Age: 67
End: 2022-03-11

## 2022-03-11 ENCOUNTER — APPOINTMENT (OUTPATIENT)
Dept: INTERNAL MEDICINE | Facility: CLINIC | Age: 67
End: 2022-03-11

## 2022-04-06 ENCOUNTER — TRANSCRIPTION ENCOUNTER (OUTPATIENT)
Age: 67
End: 2022-04-06

## 2022-05-23 ENCOUNTER — TRANSCRIPTION ENCOUNTER (OUTPATIENT)
Age: 67
End: 2022-05-23

## 2022-06-10 ENCOUNTER — TRANSCRIPTION ENCOUNTER (OUTPATIENT)
Age: 67
End: 2022-06-10

## 2022-09-12 RX ORDER — POTASSIUM CHLORIDE 1500 MG/1
20 TABLET, FILM COATED, EXTENDED RELEASE ORAL
Qty: 90 | Refills: 1 | Status: ACTIVE | COMMUNITY
Start: 2021-05-20 | End: 1900-01-01

## 2022-10-14 ENCOUNTER — APPOINTMENT (OUTPATIENT)
Dept: INTERNAL MEDICINE | Facility: CLINIC | Age: 67
End: 2022-10-14

## 2022-10-14 VITALS
BODY MASS INDEX: 27.79 KG/M2 | DIASTOLIC BLOOD PRESSURE: 80 MMHG | WEIGHT: 151 LBS | SYSTOLIC BLOOD PRESSURE: 130 MMHG | HEIGHT: 62 IN

## 2022-10-14 DIAGNOSIS — J45.909 UNSPECIFIED ASTHMA, UNCOMPLICATED: ICD-10-CM

## 2022-10-14 DIAGNOSIS — F32.2 MAJOR DEPRESSIVE DISORDER, SINGLE EPISODE, SEVERE W/OUT PSYCHOTIC FEATURES: ICD-10-CM

## 2022-10-14 DIAGNOSIS — M54.2 CERVICALGIA: ICD-10-CM

## 2022-10-14 DIAGNOSIS — I48.91 UNSPECIFIED ATRIAL FIBRILLATION: ICD-10-CM

## 2022-10-14 LAB
BILIRUB UR QL STRIP: NEGATIVE
CLARITY UR: CLEAR
COLLECTION METHOD: NORMAL
GLUCOSE UR-MCNC: NEGATIVE
HCG UR QL: 0.2 EU/DL
HGB UR QL STRIP.AUTO: NEGATIVE
KETONES UR-MCNC: NEGATIVE
LEUKOCYTE ESTERASE UR QL STRIP: NEGATIVE
NITRITE UR QL STRIP: NEGATIVE
PH UR STRIP: 6
PROT UR STRIP-MCNC: NEGATIVE
SP GR UR STRIP: 1.02

## 2022-10-14 PROCEDURE — 81003 URINALYSIS AUTO W/O SCOPE: CPT | Mod: QW

## 2022-10-14 PROCEDURE — 99214 OFFICE O/P EST MOD 30 MIN: CPT | Mod: 25

## 2022-10-14 RX ORDER — CYCLOBENZAPRINE HYDROCHLORIDE 10 MG/1
10 TABLET, FILM COATED ORAL EVERY 8 HOURS
Qty: 21 | Refills: 0 | Status: DISCONTINUED | COMMUNITY
Start: 2022-02-25 | End: 2022-10-14

## 2022-10-15 PROBLEM — F32.2 SEVERE DEPRESSION: Status: RESOLVED | Noted: 2021-03-29 | Resolved: 2022-10-14

## 2022-10-15 NOTE — HISTORY OF PRESENT ILLNESS
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

## 2022-10-15 NOTE — PHYSICAL EXAM
[No Acute Distress] : no acute distress [Well Nourished] : well nourished [PERRL] : pupils equal round and reactive to light [EOMI] : extraocular movements intact [No Accessory Muscle Use] : no accessory muscle use [Clear to Auscultation] : lungs were clear to auscultation bilaterally [Regular Rhythm] : with a regular rhythm [Normal S1, S2] : normal S1 and S2 [No Murmur] : no murmur heard [No Edema] : there was no peripheral edema [Soft] : abdomen soft [Non Tender] : non-tender [Non-distended] : non-distended [No Masses] : no abdominal mass palpated [No HSM] : no HSM [Normal Bowel Sounds] : normal bowel sounds [Normal] : the cranial nerves were intact [Sensation Tactile Decrease] : light touch was intact [2+] : left 2+ [Normal Affect] : the affect was normal [Normal Insight/Judgement] : insight and judgment were intact

## 2022-10-17 ENCOUNTER — TRANSCRIPTION ENCOUNTER (OUTPATIENT)
Age: 67
End: 2022-10-17

## 2022-10-17 ENCOUNTER — NON-APPOINTMENT (OUTPATIENT)
Age: 67
End: 2022-10-17

## 2022-10-17 LAB — BACTERIA UR CULT: NORMAL

## 2022-11-18 ENCOUNTER — LABORATORY RESULT (OUTPATIENT)
Age: 67
End: 2022-11-18

## 2022-11-18 LAB
ALBUMIN SERPL ELPH-MCNC: 4.7 G/DL
ALP BLD-CCNC: 71 U/L
ALT SERPL-CCNC: 16 U/L
ANION GAP SERPL CALC-SCNC: 10 MMOL/L
AST SERPL-CCNC: 14 U/L
BILIRUB SERPL-MCNC: 0.3 MG/DL
BUN SERPL-MCNC: 15 MG/DL
CALCIUM SERPL-MCNC: 10.2 MG/DL
CHLORIDE SERPL-SCNC: 103 MMOL/L
CHOLEST SERPL-MCNC: 183 MG/DL
CO2 SERPL-SCNC: 28 MMOL/L
CREAT SERPL-MCNC: 0.66 MG/DL
EGFR: 96 ML/MIN/1.73M2
GLUCOSE SERPL-MCNC: 100 MG/DL
HDLC SERPL-MCNC: 66 MG/DL
LDLC SERPL CALC-MCNC: 86 MG/DL
NONHDLC SERPL-MCNC: 117 MG/DL
POTASSIUM SERPL-SCNC: 3.6 MMOL/L
PROT SERPL-MCNC: 6.7 G/DL
SODIUM SERPL-SCNC: 141 MMOL/L
TRIGL SERPL-MCNC: 158 MG/DL
TSH SERPL-ACNC: 0.93 UIU/ML

## 2022-11-21 ENCOUNTER — TRANSCRIPTION ENCOUNTER (OUTPATIENT)
Age: 67
End: 2022-11-21

## 2022-11-21 LAB
BASOPHILS # BLD AUTO: 0.06 K/UL
BASOPHILS NFR BLD AUTO: 0.9 %
EOSINOPHIL # BLD AUTO: 0.23 K/UL
EOSINOPHIL NFR BLD AUTO: 3.5 %
FOLATE SERPL-MCNC: 13.9 NG/ML
HCT VFR BLD CALC: 40.1 %
HGB BLD-MCNC: 13 G/DL
IMM GRANULOCYTES NFR BLD AUTO: 0.2 %
LYMPHOCYTES # BLD AUTO: 3.08 K/UL
LYMPHOCYTES NFR BLD AUTO: 46.2 %
MAN DIFF?: NORMAL
MCHC RBC-ENTMCNC: 28.3 PG
MCHC RBC-ENTMCNC: 32.4 GM/DL
MCV RBC AUTO: 87.4 FL
MONOCYTES # BLD AUTO: 0.37 K/UL
MONOCYTES NFR BLD AUTO: 5.6 %
NEUTROPHILS # BLD AUTO: 2.91 K/UL
NEUTROPHILS NFR BLD AUTO: 43.6 %
PLATELET # BLD AUTO: 253 K/UL
RBC # BLD: 4.59 M/UL
RBC # FLD: 13.3 %
VIT B12 SERPL-MCNC: 1257 PG/ML
WBC # FLD AUTO: 6.66 K/UL

## 2022-11-29 ENCOUNTER — NON-APPOINTMENT (OUTPATIENT)
Age: 67
End: 2022-11-29

## 2022-11-29 LAB
APPEARANCE: CLEAR
BILIRUBIN URINE: NEGATIVE
BLOOD URINE: NEGATIVE
COLOR: YELLOW
ESTIMATED AVERAGE GLUCOSE: 126 MG/DL
GLUCOSE QUALITATIVE U: NEGATIVE
HBA1C MFR BLD HPLC: 6 %
KETONES URINE: NEGATIVE
LEUKOCYTE ESTERASE URINE: NEGATIVE
NITRITE URINE: NEGATIVE
PH URINE: 8
PROTEIN URINE: ABNORMAL
SPECIFIC GRAVITY URINE: 1.02
UROBILINOGEN URINE: NORMAL

## 2023-01-02 ENCOUNTER — TRANSCRIPTION ENCOUNTER (OUTPATIENT)
Age: 68
End: 2023-01-02

## 2023-01-03 ENCOUNTER — APPOINTMENT (OUTPATIENT)
Dept: INTERNAL MEDICINE | Facility: CLINIC | Age: 68
End: 2023-01-03
Payer: MEDICARE

## 2023-01-03 VITALS
OXYGEN SATURATION: 98 % | WEIGHT: 151 LBS | HEIGHT: 62 IN | TEMPERATURE: 97.9 F | BODY MASS INDEX: 27.79 KG/M2 | DIASTOLIC BLOOD PRESSURE: 80 MMHG | SYSTOLIC BLOOD PRESSURE: 140 MMHG

## 2023-01-03 DIAGNOSIS — G44.209 TENSION-TYPE HEADACHE, UNSPECIFIED, NOT INTRACTABLE: ICD-10-CM

## 2023-01-03 PROCEDURE — 99214 OFFICE O/P EST MOD 30 MIN: CPT | Mod: 25

## 2023-01-03 RX ORDER — NAPROXEN 250 MG/1
250 TABLET ORAL
Refills: 0 | Status: DISCONTINUED | COMMUNITY
Start: 2022-02-25 | End: 2023-01-03

## 2023-01-03 RX ORDER — METOPROLOL SUCCINATE 50 MG/1
50 TABLET, EXTENDED RELEASE ORAL DAILY
Refills: 3 | Status: DISCONTINUED | COMMUNITY
Start: 2017-01-27 | End: 2023-01-03

## 2023-01-03 NOTE — PHYSICAL EXAM
[No Acute Distress] : no acute distress [PERRL] : pupils equal round and reactive to light [EOMI] : extraocular movements intact [No Accessory Muscle Use] : no accessory muscle use [Clear to Auscultation] : lungs were clear to auscultation bilaterally [Regular Rhythm] : with a regular rhythm [Normal S1, S2] : normal S1 and S2 [No Murmur] : no murmur heard [No Edema] : there was no peripheral edema [Normal Appearance] : normal in appearance [No Nipple Discharge] : no nipple discharge [No Axillary Lymphadenopathy] : no axillary lymphadenopathy [Soft] : abdomen soft [Non Tender] : non-tender [Non-distended] : non-distended [No Masses] : no abdominal mass palpated [No HSM] : no HSM [Normal Bowel Sounds] : normal bowel sounds [No Spinal Tenderness] : no spinal tenderness [Normal] : the cranial nerves were intact [Sensation Tactile Decrease] : light touch was intact [2+] : left 2+ [Normal Affect] : the affect was normal [Normal Insight/Judgement] : insight and judgment were intact [de-identified] : mild b/l serouse effusion

## 2023-01-03 NOTE — HISTORY OF PRESENT ILLNESS
[FreeTextEntry8] : cc: elevated BP \par \par 'posterior HA x 4 days - started 1 hour after wakign up - m aximum pain 5/10 - constant - tthen 3/10 \par takes 1 tylenol then comes back in 6 hours \par \par no weakness\par \par also noted BP was high - 152/84.. 180/90  [Spouse] : spouse

## 2023-01-04 LAB
APPEARANCE: CLEAR
BACTERIA: NEGATIVE
BILIRUBIN URINE: NEGATIVE
BLOOD URINE: NEGATIVE
COLOR: NORMAL
CREAT SPEC-SCNC: 39 MG/DL
CREAT SPEC-SCNC: 39 MG/DL
CREAT/PROT UR: NORMAL RATIO
GLUCOSE QUALITATIVE U: NEGATIVE
HYALINE CASTS: 0 /LPF
KETONES URINE: NEGATIVE
LEUKOCYTE ESTERASE URINE: NEGATIVE
MICROALBUMIN 24H UR DL<=1MG/L-MCNC: <1.2 MG/DL
MICROALBUMIN/CREAT 24H UR-RTO: NORMAL MG/G
MICROSCOPIC-UA: NORMAL
NITRITE URINE: NEGATIVE
PH URINE: 6.5
PROT UR-MCNC: <4 MG/DL
PROTEIN URINE: NEGATIVE
RED BLOOD CELLS URINE: 0 /HPF
SPECIFIC GRAVITY URINE: 1.01
SQUAMOUS EPITHELIAL CELLS: 1 /HPF
UROBILINOGEN URINE: NORMAL
WHITE BLOOD CELLS URINE: 1 /HPF

## 2023-01-06 ENCOUNTER — TRANSCRIPTION ENCOUNTER (OUTPATIENT)
Age: 68
End: 2023-01-06

## 2023-01-06 LAB — BACTERIA UR CULT: NORMAL

## 2023-01-07 ENCOUNTER — TRANSCRIPTION ENCOUNTER (OUTPATIENT)
Age: 68
End: 2023-01-07

## 2023-01-10 ENCOUNTER — NON-APPOINTMENT (OUTPATIENT)
Age: 68
End: 2023-01-10

## 2023-01-11 ENCOUNTER — RX RENEWAL (OUTPATIENT)
Age: 68
End: 2023-01-11

## 2023-01-13 ENCOUNTER — APPOINTMENT (OUTPATIENT)
Dept: INTERNAL MEDICINE | Facility: CLINIC | Age: 68
End: 2023-01-13

## 2023-01-30 ENCOUNTER — TRANSCRIPTION ENCOUNTER (OUTPATIENT)
Age: 68
End: 2023-01-30

## 2023-02-02 ENCOUNTER — TRANSCRIPTION ENCOUNTER (OUTPATIENT)
Age: 68
End: 2023-02-02

## 2023-02-17 ENCOUNTER — RX RENEWAL (OUTPATIENT)
Age: 68
End: 2023-02-17

## 2023-03-17 ENCOUNTER — RX RENEWAL (OUTPATIENT)
Age: 68
End: 2023-03-17

## 2023-03-20 ENCOUNTER — TRANSCRIPTION ENCOUNTER (OUTPATIENT)
Age: 68
End: 2023-03-20

## 2023-04-19 ENCOUNTER — RX RENEWAL (OUTPATIENT)
Age: 68
End: 2023-04-19

## 2023-04-19 ENCOUNTER — TRANSCRIPTION ENCOUNTER (OUTPATIENT)
Age: 68
End: 2023-04-19

## 2023-04-26 ENCOUNTER — RX RENEWAL (OUTPATIENT)
Age: 68
End: 2023-04-26

## 2023-05-22 ENCOUNTER — RX RENEWAL (OUTPATIENT)
Age: 68
End: 2023-05-22

## 2023-06-22 ENCOUNTER — RX RENEWAL (OUTPATIENT)
Age: 68
End: 2023-06-22

## 2023-07-31 ENCOUNTER — RX RENEWAL (OUTPATIENT)
Age: 68
End: 2023-07-31

## 2023-08-28 ENCOUNTER — RX RENEWAL (OUTPATIENT)
Age: 68
End: 2023-08-28

## 2023-08-30 ENCOUNTER — TRANSCRIPTION ENCOUNTER (OUTPATIENT)
Age: 68
End: 2023-08-30

## 2023-09-08 ENCOUNTER — APPOINTMENT (OUTPATIENT)
Dept: INTERNAL MEDICINE | Facility: CLINIC | Age: 68
End: 2023-09-08
Payer: MEDICARE

## 2023-09-08 ENCOUNTER — NON-APPOINTMENT (OUTPATIENT)
Age: 68
End: 2023-09-08

## 2023-09-08 DIAGNOSIS — R42 DIZZINESS AND GIDDINESS: ICD-10-CM

## 2023-09-08 DIAGNOSIS — F41.9 ANXIETY DISORDER, UNSPECIFIED: ICD-10-CM

## 2023-09-08 DIAGNOSIS — R00.2 PALPITATIONS: ICD-10-CM

## 2023-09-08 DIAGNOSIS — I10 ESSENTIAL (PRIMARY) HYPERTENSION: ICD-10-CM

## 2023-09-08 DIAGNOSIS — M54.9 DORSALGIA, UNSPECIFIED: ICD-10-CM

## 2023-09-08 DIAGNOSIS — G44.86 CERVICOGENIC HEADACHE: ICD-10-CM

## 2023-09-08 DIAGNOSIS — Z95.818 PRESENCE OF OTHER CARDIAC IMPLANTS AND GRAFTS: ICD-10-CM

## 2023-09-08 DIAGNOSIS — R10.31 RIGHT LOWER QUADRANT PAIN: ICD-10-CM

## 2023-09-08 DIAGNOSIS — Z87.19 PERSONAL HISTORY OF OTHER DISEASES OF THE DIGESTIVE SYSTEM: ICD-10-CM

## 2023-09-08 DIAGNOSIS — R73.03 PREDIABETES.: ICD-10-CM

## 2023-09-08 PROCEDURE — 99214 OFFICE O/P EST MOD 30 MIN: CPT | Mod: 25

## 2023-09-08 PROCEDURE — 93000 ELECTROCARDIOGRAM COMPLETE: CPT | Mod: 59

## 2023-09-11 ENCOUNTER — TRANSCRIPTION ENCOUNTER (OUTPATIENT)
Age: 68
End: 2023-09-11

## 2023-09-11 VITALS — SYSTOLIC BLOOD PRESSURE: 110 MMHG | DIASTOLIC BLOOD PRESSURE: 80 MMHG

## 2023-09-11 PROBLEM — R00.2 INTERMITTENT PALPITATIONS: Status: ACTIVE | Noted: 2023-09-11

## 2023-09-11 PROBLEM — R00.2 INTERMITTENT PALPITATIONS: Status: RESOLVED | Noted: 2021-05-28 | Resolved: 2023-09-11

## 2023-09-11 PROBLEM — R10.31 ABDOMINAL DISCOMFORT IN RIGHT LOWER QUADRANT: Status: RESOLVED | Noted: 2022-10-15 | Resolved: 2023-09-11

## 2023-09-11 PROBLEM — I10 BENIGN HYPERTENSION: Status: ACTIVE | Noted: 2017-01-27

## 2023-09-11 PROBLEM — R42 DIZZINESS: Status: ACTIVE | Noted: 2023-09-08

## 2023-09-11 PROBLEM — F41.9 ANXIETY: Status: ACTIVE | Noted: 2021-03-30

## 2023-09-11 PROBLEM — G44.86 CERVICOGENIC HEADACHE: Status: ACTIVE | Noted: 2023-09-11

## 2023-09-11 PROBLEM — M54.9 RIGHT-SIDED BACK PAIN: Status: RESOLVED | Noted: 2021-10-01 | Resolved: 2023-09-11

## 2023-09-11 RX ORDER — ALBUTEROL SULFATE 90 UG/1
108 (90 BASE) AEROSOL, METERED RESPIRATORY (INHALATION)
Qty: 3 | Refills: 3 | Status: ACTIVE | COMMUNITY
Start: 2020-12-18 | End: 1900-01-01

## 2023-10-04 ENCOUNTER — LABORATORY RESULT (OUTPATIENT)
Age: 68
End: 2023-10-04

## 2023-10-11 ENCOUNTER — TRANSCRIPTION ENCOUNTER (OUTPATIENT)
Age: 68
End: 2023-10-11

## 2023-11-06 ENCOUNTER — RX RENEWAL (OUTPATIENT)
Age: 68
End: 2023-11-06

## 2023-11-10 ENCOUNTER — RX RENEWAL (OUTPATIENT)
Age: 68
End: 2023-11-10

## 2023-11-11 ENCOUNTER — TRANSCRIPTION ENCOUNTER (OUTPATIENT)
Age: 68
End: 2023-11-11

## 2023-11-11 LAB
ALBUMIN SERPL ELPH-MCNC: 4.3 G/DL
ALP BLD-CCNC: 67 U/L
ALT SERPL-CCNC: 29 U/L
ANION GAP SERPL CALC-SCNC: 13 MMOL/L
APPEARANCE: CLEAR
AST SERPL-CCNC: 22 U/L
BASOPHILS # BLD AUTO: 0.07 K/UL
BASOPHILS NFR BLD AUTO: 1 %
BILIRUB SERPL-MCNC: 0.3 MG/DL
BILIRUBIN URINE: NEGATIVE
BLOOD URINE: NEGATIVE
BUN SERPL-MCNC: 19 MG/DL
CALCIUM SERPL-MCNC: 9.5 MG/DL
CHLORIDE SERPL-SCNC: 104 MMOL/L
CHOLEST SERPL-MCNC: 166 MG/DL
CO2 SERPL-SCNC: 26 MMOL/L
COLOR: YELLOW
CREAT SERPL-MCNC: 0.68 MG/DL
CREAT SPEC-SCNC: 282 MG/DL
EGFR: 95 ML/MIN/1.73M2
EOSINOPHIL # BLD AUTO: 0.18 K/UL
EOSINOPHIL NFR BLD AUTO: 2.6 %
ESTIMATED AVERAGE GLUCOSE: 126 MG/DL
FOLATE SERPL-MCNC: 13.1 NG/ML
GLUCOSE QUALITATIVE U: NEGATIVE MG/DL
GLUCOSE SERPL-MCNC: 111 MG/DL
HBA1C MFR BLD HPLC: 6 %
HCT VFR BLD CALC: 38.2 %
HDLC SERPL-MCNC: 67 MG/DL
HGB BLD-MCNC: 12.2 G/DL
IMM GRANULOCYTES NFR BLD AUTO: 0.4 %
KETONES URINE: NEGATIVE MG/DL
LDLC SERPL CALC-MCNC: 81 MG/DL
LEUKOCYTE ESTERASE URINE: NEGATIVE
LYMPHOCYTES # BLD AUTO: 3.01 K/UL
LYMPHOCYTES NFR BLD AUTO: 42.8 %
MAN DIFF?: NORMAL
MCHC RBC-ENTMCNC: 27.9 PG
MCHC RBC-ENTMCNC: 31.9 GM/DL
MCV RBC AUTO: 87.4 FL
MICROALBUMIN 24H UR DL<=1MG/L-MCNC: <1.2 MG/DL
MICROALBUMIN/CREAT 24H UR-RTO: NORMAL MG/G
MONOCYTES # BLD AUTO: 0.46 K/UL
MONOCYTES NFR BLD AUTO: 6.5 %
NEUTROPHILS # BLD AUTO: 3.28 K/UL
NEUTROPHILS NFR BLD AUTO: 46.7 %
NITRITE URINE: NEGATIVE
NONHDLC SERPL-MCNC: 99 MG/DL
PH URINE: 6.5
PLATELET # BLD AUTO: 250 K/UL
POTASSIUM SERPL-SCNC: 4 MMOL/L
PROT SERPL-MCNC: 6.3 G/DL
PROTEIN URINE: 30 MG/DL
RBC # BLD: 4.37 M/UL
RBC # FLD: 13.9 %
SODIUM SERPL-SCNC: 142 MMOL/L
SPECIFIC GRAVITY URINE: >1.03
T4 FREE SERPL-MCNC: 1.1 NG/DL
TRIGL SERPL-MCNC: 101 MG/DL
TSH SERPL-ACNC: 1.24 UIU/ML
UROBILINOGEN URINE: 0.2 MG/DL
VIT B12 SERPL-MCNC: 955 PG/ML
WBC # FLD AUTO: 7.03 K/UL

## 2023-11-15 ENCOUNTER — TRANSCRIPTION ENCOUNTER (OUTPATIENT)
Age: 68
End: 2023-11-15

## 2023-11-15 RX ORDER — SERTRALINE 25 MG/1
25 TABLET, FILM COATED ORAL
Qty: 90 | Refills: 1 | Status: ACTIVE | COMMUNITY
Start: 2022-10-14 | End: 1900-01-01

## 2023-11-15 RX ORDER — ALPRAZOLAM 0.5 MG/1
0.5 TABLET ORAL
Qty: 30 | Refills: 0 | Status: ACTIVE | COMMUNITY
Start: 2021-02-20 | End: 1900-01-01

## 2023-11-17 ENCOUNTER — APPOINTMENT (OUTPATIENT)
Dept: INTERNAL MEDICINE | Facility: CLINIC | Age: 68
End: 2023-11-17
Payer: MEDICARE

## 2023-11-17 VITALS — SYSTOLIC BLOOD PRESSURE: 120 MMHG | DIASTOLIC BLOOD PRESSURE: 70 MMHG

## 2023-11-17 VITALS — HEIGHT: 59 IN | BODY MASS INDEX: 31.04 KG/M2 | WEIGHT: 154 LBS

## 2023-11-17 DIAGNOSIS — Z00.00 ENCOUNTER FOR GENERAL ADULT MEDICAL EXAMINATION W/OUT ABNORMAL FINDINGS: ICD-10-CM

## 2023-11-17 DIAGNOSIS — R92.2 INCONCLUSIVE MAMMOGRAM: ICD-10-CM

## 2023-11-17 DIAGNOSIS — R10.2 PELVIC AND PERINEAL PAIN: ICD-10-CM

## 2023-11-17 DIAGNOSIS — R92.30 INCONCLUSIVE MAMMOGRAM: ICD-10-CM

## 2023-11-17 PROCEDURE — G0444 DEPRESSION SCREEN ANNUAL: CPT

## 2023-11-17 PROCEDURE — G0439: CPT

## 2023-11-18 ENCOUNTER — TRANSCRIPTION ENCOUNTER (OUTPATIENT)
Age: 68
End: 2023-11-18

## 2023-11-20 ENCOUNTER — TRANSCRIPTION ENCOUNTER (OUTPATIENT)
Age: 68
End: 2023-11-20

## 2023-11-20 LAB
ALBUMIN SERPL ELPH-MCNC: 4.8 G/DL
ALP BLD-CCNC: 82 U/L
ALT SERPL-CCNC: 17 U/L
ANION GAP SERPL CALC-SCNC: 17 MMOL/L
APPEARANCE: CLEAR
AST SERPL-CCNC: 15 U/L
BACTERIA UR CULT: NORMAL
BACTERIA: NEGATIVE /HPF
BASOPHILS # BLD AUTO: 0.06 K/UL
BASOPHILS NFR BLD AUTO: 0.9 %
BILIRUB SERPL-MCNC: 0.3 MG/DL
BILIRUBIN URINE: NEGATIVE
BLOOD URINE: NEGATIVE
BUN SERPL-MCNC: 14 MG/DL
CALCIUM SERPL-MCNC: 10 MG/DL
CAST: 0 /LPF
CHLORIDE SERPL-SCNC: 100 MMOL/L
CO2 SERPL-SCNC: 29 MMOL/L
COLOR: YELLOW
CREAT SERPL-MCNC: 0.67 MG/DL
CREAT SPEC-SCNC: 179 MG/DL
CREAT SPEC-SCNC: 179 MG/DL
CREAT/PROT UR: 0.1 RATIO
EGFR: 95 ML/MIN/1.73M2
EOSINOPHIL # BLD AUTO: 0.16 K/UL
EOSINOPHIL NFR BLD AUTO: 2.3 %
EPITHELIAL CELLS: 2 /HPF
GLUCOSE QUALITATIVE U: NEGATIVE MG/DL
GLUCOSE SERPL-MCNC: 102 MG/DL
HCT VFR BLD CALC: 40.8 %
HGB BLD-MCNC: 13.2 G/DL
IMM GRANULOCYTES NFR BLD AUTO: 0.4 %
KETONES URINE: NEGATIVE MG/DL
LEUKOCYTE ESTERASE URINE: NEGATIVE
LYMPHOCYTES # BLD AUTO: 2.72 K/UL
LYMPHOCYTES NFR BLD AUTO: 39.8 %
MAN DIFF?: NORMAL
MCHC RBC-ENTMCNC: 28.6 PG
MCHC RBC-ENTMCNC: 32.4 GM/DL
MCV RBC AUTO: 88.3 FL
MICROALBUMIN 24H UR DL<=1MG/L-MCNC: <1.2 MG/DL
MICROALBUMIN/CREAT 24H UR-RTO: NORMAL MG/G
MICROSCOPIC-UA: NORMAL
MONOCYTES # BLD AUTO: 0.35 K/UL
MONOCYTES NFR BLD AUTO: 5.1 %
NEUTROPHILS # BLD AUTO: 3.51 K/UL
NEUTROPHILS NFR BLD AUTO: 51.5 %
NITRITE URINE: NEGATIVE
PH URINE: 7
PLATELET # BLD AUTO: 280 K/UL
POTASSIUM SERPL-SCNC: 4.2 MMOL/L
PROT SERPL-MCNC: 7 G/DL
PROT UR-MCNC: 9 MG/DL
PROTEIN URINE: NEGATIVE MG/DL
RBC # BLD: 4.62 M/UL
RBC # FLD: 13.6 %
RED BLOOD CELLS URINE: 1 /HPF
SODIUM SERPL-SCNC: 146 MMOL/L
SPECIFIC GRAVITY URINE: 1.02
UROBILINOGEN URINE: 0.2 MG/DL
WBC # FLD AUTO: 6.83 K/UL
WHITE BLOOD CELLS URINE: 0 /HPF

## 2023-11-24 ENCOUNTER — TRANSCRIPTION ENCOUNTER (OUTPATIENT)
Age: 68
End: 2023-11-24

## 2023-11-27 ENCOUNTER — TRANSCRIPTION ENCOUNTER (OUTPATIENT)
Age: 68
End: 2023-11-27

## 2023-11-28 ENCOUNTER — TRANSCRIPTION ENCOUNTER (OUTPATIENT)
Age: 68
End: 2023-11-28

## 2024-01-12 ENCOUNTER — APPOINTMENT (OUTPATIENT)
Dept: ORTHOPEDIC SURGERY | Facility: CLINIC | Age: 69
End: 2024-01-12
Payer: MEDICARE

## 2024-01-12 VITALS — HEIGHT: 60 IN | WEIGHT: 157 LBS | BODY MASS INDEX: 30.82 KG/M2

## 2024-01-12 DIAGNOSIS — I10 ESSENTIAL (PRIMARY) HYPERTENSION: ICD-10-CM

## 2024-01-12 DIAGNOSIS — M75.42 IMPINGEMENT SYNDROME OF LEFT SHOULDER: ICD-10-CM

## 2024-01-12 DIAGNOSIS — E78.00 PURE HYPERCHOLESTEROLEMIA, UNSPECIFIED: ICD-10-CM

## 2024-01-12 DIAGNOSIS — J45.909 UNSPECIFIED ASTHMA, UNCOMPLICATED: ICD-10-CM

## 2024-01-12 DIAGNOSIS — M75.41 IMPINGEMENT SYNDROME OF RIGHT SHOULDER: ICD-10-CM

## 2024-01-12 PROCEDURE — 73010 X-RAY EXAM OF SHOULDER BLADE: CPT | Mod: 50

## 2024-01-12 PROCEDURE — 73030 X-RAY EXAM OF SHOULDER: CPT | Mod: 50

## 2024-01-12 PROCEDURE — 99204 OFFICE O/P NEW MOD 45 MIN: CPT

## 2024-01-12 RX ORDER — DICLOFENAC SODIUM 75 MG/1
75 TABLET, DELAYED RELEASE ORAL TWICE DAILY
Qty: 60 | Refills: 2 | Status: COMPLETED | COMMUNITY
Start: 2024-01-12 | End: 2024-04-11

## 2024-01-12 NOTE — ASSESSMENT
[FreeTextEntry1] : Bilateral shoulder impingement, bursitis. Activity as tolerated. NSAIDs PRN. Rx Diclofenac. Physical therapy. Consider SA injection if pain increases. Consider MRI if pain persists. RTO 6-8 weeks.   Entered by Jesika Jason acting as scribe.

## 2024-01-12 NOTE — HISTORY OF PRESENT ILLNESS
[8] : 8 [5] : 5 [Sharp] : sharp [Constant] : constant [Household chores] : household chores [Leisure] : leisure [Work] : work [Sleep] : sleep [Rest] : rest [Meds] : meds [Heat] : heat [de-identified] : 01/12/2024: PERNELL VIERA is a 68 year old F here [facility] for an evaluation of SHASHI shoulder pain. She reports that about a month ago she began experiencing excruciating pain in SHASHI shoulder with no known injury or fall. Left is worse than right. Pain is gradually worsening. Most pain with reaching behind. Pain is worst at night. Pt states that her pain does not radiate down her arms, more towards SHASHI scapula, and is present anteriorly as well. No numbness or tingling. No formal treatment thus far. Pt takes Tylenol and Advil for pain with relief, and Pt notes she is getting massages from her sister.   PMHx: Asthma, high cholesterol, HBP.  [] : no [FreeTextEntry1] : SHASHI shoulders [de-identified] : Movement

## 2024-01-12 NOTE — PHYSICAL EXAM
[Bilateral] : shoulder bilaterally [5 ___] : forward flexion 5[unfilled]/5 [5___] : internal rotation 5[unfilled]/5 [] : negative Speed's [FreeTextEntry9] : FE: R 145, L 135 ER: R 60, L 55 IR: R L3-4, L L3-4 [de-identified] : Left shoulder

## 2024-01-30 ENCOUNTER — TRANSCRIPTION ENCOUNTER (OUTPATIENT)
Age: 69
End: 2024-01-30

## 2024-01-30 RX ORDER — SIMVASTATIN 40 MG/1
40 TABLET, FILM COATED ORAL
Qty: 90 | Refills: 1 | Status: ACTIVE | COMMUNITY
Start: 2019-01-28 | End: 1900-01-01

## 2024-02-23 ENCOUNTER — APPOINTMENT (OUTPATIENT)
Dept: ORTHOPEDIC SURGERY | Facility: CLINIC | Age: 69
End: 2024-02-23

## 2024-02-23 VITALS — WEIGHT: 157 LBS | BODY MASS INDEX: 30.82 KG/M2 | HEIGHT: 60 IN

## 2024-02-24 ENCOUNTER — RX RENEWAL (OUTPATIENT)
Age: 69
End: 2024-02-24

## 2024-03-25 ENCOUNTER — TRANSCRIPTION ENCOUNTER (OUTPATIENT)
Age: 69
End: 2024-03-25

## 2024-03-25 RX ORDER — METOPROLOL SUCCINATE 50 MG/1
50 TABLET, EXTENDED RELEASE ORAL
Qty: 90 | Refills: 1 | Status: ACTIVE | COMMUNITY
Start: 2023-01-03 | End: 1900-01-01

## 2024-03-26 ENCOUNTER — TRANSCRIPTION ENCOUNTER (OUTPATIENT)
Age: 69
End: 2024-03-26

## 2024-03-29 ENCOUNTER — TRANSCRIPTION ENCOUNTER (OUTPATIENT)
Age: 69
End: 2024-03-29

## 2024-03-29 RX ORDER — CHLORTHALIDONE 50 MG/1
50 TABLET ORAL
Qty: 90 | Refills: 1 | Status: ACTIVE | COMMUNITY
Start: 2023-09-08 | End: 1900-01-01

## 2024-04-01 ENCOUNTER — TRANSCRIPTION ENCOUNTER (OUTPATIENT)
Age: 69
End: 2024-04-01

## 2024-05-20 ENCOUNTER — RX RENEWAL (OUTPATIENT)
Age: 69
End: 2024-05-20

## 2024-05-20 RX ORDER — AMLODIPINE BESYLATE 5 MG/1
5 TABLET ORAL
Qty: 90 | Refills: 0 | Status: ACTIVE | COMMUNITY
Start: 2023-01-10 | End: 1900-01-01

## 2024-07-25 ENCOUNTER — RX RENEWAL (OUTPATIENT)
Age: 69
End: 2024-07-25

## 2024-08-23 ENCOUNTER — APPOINTMENT (OUTPATIENT)
Dept: ENDOCRINOLOGY | Facility: CLINIC | Age: 69
End: 2024-08-23

## 2024-08-23 DIAGNOSIS — Z20.822 CONTACT WITH AND (SUSPECTED) EXPOSURE TO COVID-19: ICD-10-CM

## 2024-08-23 RX ORDER — METFORMIN HYDROCHLORIDE 500 MG/1
500 TABLET, COATED ORAL
Qty: 36 | Refills: 0 | Status: ACTIVE | COMMUNITY
Start: 2024-08-23 | End: 1900-01-01

## 2024-08-23 RX ORDER — NIRMATRELVIR AND RITONAVIR 300-100 MG
20 X 150 MG & KIT ORAL
Qty: 1 | Refills: 0 | Status: ACTIVE | COMMUNITY
Start: 2024-08-23 | End: 1900-01-01

## 2024-08-24 ENCOUNTER — NON-APPOINTMENT (OUTPATIENT)
Age: 69
End: 2024-08-24

## 2024-08-27 ENCOUNTER — NON-APPOINTMENT (OUTPATIENT)
Age: 69
End: 2024-08-27

## 2024-09-03 ENCOUNTER — TRANSCRIPTION ENCOUNTER (OUTPATIENT)
Age: 69
End: 2024-09-03

## 2024-09-04 ENCOUNTER — NON-APPOINTMENT (OUTPATIENT)
Age: 69
End: 2024-09-04

## 2024-09-20 ENCOUNTER — APPOINTMENT (OUTPATIENT)
Dept: ENDOCRINOLOGY | Facility: CLINIC | Age: 69
End: 2024-09-20
Payer: MEDICARE

## 2024-09-20 VITALS
HEART RATE: 96 BPM | OXYGEN SATURATION: 98 % | SYSTOLIC BLOOD PRESSURE: 126 MMHG | DIASTOLIC BLOOD PRESSURE: 70 MMHG | WEIGHT: 170 LBS | BODY MASS INDEX: 33.38 KG/M2 | HEIGHT: 60 IN

## 2024-09-20 DIAGNOSIS — E66.9 OBESITY, UNSPECIFIED: ICD-10-CM

## 2024-09-20 DIAGNOSIS — R73.03 PREDIABETES.: ICD-10-CM

## 2024-09-20 DIAGNOSIS — Z83.3 FAMILY HISTORY OF DIABETES MELLITUS: ICD-10-CM

## 2024-09-20 DIAGNOSIS — E78.5 HYPERLIPIDEMIA, UNSPECIFIED: ICD-10-CM

## 2024-09-20 DIAGNOSIS — I10 ESSENTIAL (PRIMARY) HYPERTENSION: ICD-10-CM

## 2024-09-20 PROCEDURE — 99204 OFFICE O/P NEW MOD 45 MIN: CPT

## 2024-09-20 NOTE — ASSESSMENT
[FreeTextEntry1] : Patient is a 69 F with history of prediabetes, HTN, HLD here for weight management.  # Obesity class Class 1: BMI of 30 to < 35  - Goal weight 125-130 lbs  - Discussed that healthy weight loss is 1-2 lbs a week and to lose 1 lb we must cut out 500 kcal a day - Evaluation for etiologies of obesity: TSH, no stigamata of cushing disease - Since patient has BMI> 30 or BMI 25-30 with obesity related comorbidity (hypertension, dyslipidemia, diabetes, sleep apnea), recommend lifestyle with adjunctive therapies - We discussed the following Weight management Medications - Patient not decided at this time. She has a history of small bowel obstruction concerned about the use of GLP1RA  - Will obtain TSH, FT4, A1C, lipid profile, CMP to assess obesity related comorbidities   Xenical (Orlistat) How it works: alters fat digestion by inhibiting pancreatic lipases Side effects: flatus, fecal incontinence, oily spotting, and flatus with discharge Dosin mg 3 times daily  Instructions:  - Take with each main meal containing fat (during or up to 1 hour after the meal) - Omit dose if meal is occasionally missed or contains no fat  Saxenda (Liraglutide) How it work: works on gut hormone, suppresses appetite Side effects: nausea and vomiting, rare pancreatitis, gallbladder disease Dosing: initial dose is 0.6 mg daily for the first week., dose can be increased at weekly intervals (1.2, 1.8, 2.4 mg) to the recommended dose of 3 mg Instructions: inject subcutaneously in the abdomen, thigh, or upper arm once daily  Wegovy (Semaglutide) How it work: works on gut hormone, suppresses appetite Side effects: nausea and vomiting, rare pancreatitis, gallbladder disease Dosing: initial dose is 3 mg daily for the first 30 days, then 7 mg Instructions: inject subcutaneously in the abdomen, thigh, or upper arm once daily  Qsymia (Phentermine and topiramate) How it works: appetite reduction  Side effects: dry mouth, constipation, and paresthesia  Dosing:  initial dose 3.75/23 mg for 14 days, followed by 7.5/46 mg  Instructions: avoid alcohol while on the medication as it can cause dizziness or sleepiness, if develop high heart rate, insomnia, or paresthesia please contact us  QSYMIA  1) Phentermine 3.75 mg/topiramate 23 mg once daily for 14 days  2) Increase dose to phentermine 7.5 mg/topiramate 46 mg once daily for 12 weeks then evaluate weight loss. If 3% of baseline body weight has NOT been lost, discontinue use OR increase dose to phentermine 11.25 mg/topiramate 69 mg once daily for 14 days, and then to phentermine 15 mg/topiramate 92 mg once daily.  3)Evaluate weight loss after 12 weeks on phentermine 15 mg/topiramate 92 mg; if 5% of baseline body weight has NOT been lost at dose of phentermine 15 mg/topiramate 92 mg gradually discontinue therapy (eg, 1 dose every other day for at least 1 week).  Contrave (Bupropion and naltrexone) How it works: appetite reduction, can boost mood Side effects: headache, and constipation, other adverse effects included insomnia, vomiting, dizziness, and dry mouth Dosing: initial dose is 1 tablet (8 mg of naltrexone and 90 mg of bupropion) daily. After one week, the dose is increased to one tablet twice daily and, by week four, to two tablets twice daily Instructions: avoid alcohol while on the medication  PLENITY (Cellulose and citric acid hydrogel) How it works: create a sensation of fullness and increase satiety, excreted in the large intestine Side effects: diarrhea, abdominal distention, flatulence  Dosing: take 3 capsules 20 to 30 minutes before lunch and dinner twice daily    Instructions: After taking the capsules, drink 2 additional glasses of water (8 fl oz/250 mL each)  Metformin (off label for insulin resistance) How it works: improves insulin resistance, appetite suppression  Side effects: stomach upset, diarrhea  Dosin mg daily and can titrate up to maximum 2000 mg  Instructions: monitor for adverse effects   # Prediabetes  -  6.0 10/2023 - Repeat today  - Emphasized increasing exercise   # HLD  - Patient on simvastatin 40 mg daily  - Repeat lipid profile  # HTN - BP at goal today  - Continue with chlothalidone 25 mg daily and metoprolol   RTC in 4 months with NP  Latasha Ocasio MD Endocrinology, Diabetes and Metabolism [address] [place] [address]. [address] [place] [address] Tel [phone] Fax [phone]

## 2024-09-20 NOTE — HISTORY OF PRESENT ILLNESS
[FreeTextEntry1] : Patient is a 69 F with history of prediabetes, HTN, HLD  here for weight management.   Onset: Gaining weight slowly over the years   Prior attempts  Never on special diet to try weight loss   Current weight: 157 lb  Target weight: 125 lb -130 lb   Complications:  Prediabetes A1C 6.0 10/2023 HLD--> simvastatin   Diet recall: breakfast:  did not have breakfast lunch: a croissant  dinner: shrimp, with rice, piece of fried catfish and fried chicken wing  Exercise no exercise, walk to the train   Denies fatigue, peripheral weakness, striae, hypertension or hyperglycemia. Denies change in size of hands or feet or change in jaw. Has been on prednisone for 1 week for COVID.   No current plans for pregnancy.

## 2024-09-25 LAB
ALBUMIN SERPL ELPH-MCNC: 4.6 G/DL
ALP BLD-CCNC: 66 U/L
ALT SERPL-CCNC: 25 U/L
ANION GAP SERPL CALC-SCNC: 12 MMOL/L
AST SERPL-CCNC: 14 U/L
BILIRUB SERPL-MCNC: 0.4 MG/DL
BUN SERPL-MCNC: 27 MG/DL
CALCIUM SERPL-MCNC: 10.3 MG/DL
CHLORIDE SERPL-SCNC: 99 MMOL/L
CHOLEST SERPL-MCNC: 192 MG/DL
CO2 SERPL-SCNC: 31 MMOL/L
CREAT SERPL-MCNC: 0.71 MG/DL
CREAT SPEC-SCNC: 81 MG/DL
EGFR: 92 ML/MIN/1.73M2
ESTIMATED AVERAGE GLUCOSE: 126 MG/DL
GLUCOSE SERPL-MCNC: 86 MG/DL
HBA1C MFR BLD HPLC: 6 %
HDLC SERPL-MCNC: 92 MG/DL
LDLC SERPL CALC-MCNC: 78 MG/DL
MICROALBUMIN 24H UR DL<=1MG/L-MCNC: <1.2 MG/DL
MICROALBUMIN/CREAT 24H UR-RTO: NORMAL MG/G
NONHDLC SERPL-MCNC: 100 MG/DL
POTASSIUM SERPL-SCNC: 3.3 MMOL/L
PROT SERPL-MCNC: 6.8 G/DL
SODIUM SERPL-SCNC: 142 MMOL/L
T4 FREE SERPL-MCNC: 1.4 NG/DL
TRIGL SERPL-MCNC: 134 MG/DL
TSH SERPL-ACNC: 0.72 UIU/ML

## 2024-09-27 ENCOUNTER — RX RENEWAL (OUTPATIENT)
Age: 69
End: 2024-09-27

## 2024-09-30 ENCOUNTER — RX RENEWAL (OUTPATIENT)
Age: 69
End: 2024-09-30

## 2024-10-02 ENCOUNTER — TRANSCRIPTION ENCOUNTER (OUTPATIENT)
Age: 69
End: 2024-10-02

## 2024-10-04 ENCOUNTER — TRANSCRIPTION ENCOUNTER (OUTPATIENT)
Age: 69
End: 2024-10-04

## 2024-11-12 ENCOUNTER — RX RENEWAL (OUTPATIENT)
Age: 69
End: 2024-11-12

## 2024-11-22 ENCOUNTER — APPOINTMENT (OUTPATIENT)
Dept: INTERNAL MEDICINE | Facility: CLINIC | Age: 69
End: 2024-11-22

## 2024-11-22 ENCOUNTER — NON-APPOINTMENT (OUTPATIENT)
Age: 69
End: 2024-11-22

## 2024-11-22 VITALS
DIASTOLIC BLOOD PRESSURE: 80 MMHG | HEIGHT: 60 IN | BODY MASS INDEX: 31.22 KG/M2 | SYSTOLIC BLOOD PRESSURE: 120 MMHG | WEIGHT: 159 LBS

## 2024-11-22 DIAGNOSIS — M54.12 RADICULOPATHY, CERVICAL REGION: ICD-10-CM

## 2024-11-22 DIAGNOSIS — K21.9 GASTRO-ESOPHAGEAL REFLUX DISEASE W/OUT ESOPHAGITIS: ICD-10-CM

## 2024-11-22 DIAGNOSIS — Z23 ENCOUNTER FOR IMMUNIZATION: ICD-10-CM

## 2024-11-22 DIAGNOSIS — E78.5 HYPERLIPIDEMIA, UNSPECIFIED: ICD-10-CM

## 2024-11-22 DIAGNOSIS — Z00.00 ENCOUNTER FOR GENERAL ADULT MEDICAL EXAMINATION W/OUT ABNORMAL FINDINGS: ICD-10-CM

## 2024-11-22 DIAGNOSIS — R73.03 PREDIABETES.: ICD-10-CM

## 2024-11-22 DIAGNOSIS — R20.2 PARESTHESIA OF SKIN: ICD-10-CM

## 2024-11-22 DIAGNOSIS — R92.30 DENSE BREASTS, UNSPECIFIED: ICD-10-CM

## 2024-11-22 PROCEDURE — G0008: CPT

## 2024-11-22 PROCEDURE — G0439: CPT

## 2024-11-22 PROCEDURE — 93000 ELECTROCARDIOGRAM COMPLETE: CPT

## 2024-11-22 PROCEDURE — 99213 OFFICE O/P EST LOW 20 MIN: CPT | Mod: 25

## 2024-11-22 PROCEDURE — 90662 IIV NO PRSV INCREASED AG IM: CPT

## 2024-11-23 LAB
ALBUMIN SERPL ELPH-MCNC: 4.5 G/DL
ALP BLD-CCNC: 72 U/L
ALT SERPL-CCNC: 18 U/L
ANION GAP SERPL CALC-SCNC: 15 MMOL/L
APPEARANCE: CLEAR
AST SERPL-CCNC: 18 U/L
B BURGDOR AB SER-IMP: NEGATIVE
B BURGDOR IGG+IGM SER QL: 0.07 INDEX
BASOPHILS # BLD AUTO: 0.07 K/UL
BASOPHILS NFR BLD AUTO: 1 %
BILIRUB SERPL-MCNC: 0.4 MG/DL
BILIRUBIN URINE: NEGATIVE
BLOOD URINE: NEGATIVE
BUN SERPL-MCNC: 14 MG/DL
CALCIUM SERPL-MCNC: 10.5 MG/DL
CHLORIDE SERPL-SCNC: 101 MMOL/L
CHOLEST SERPL-MCNC: 195 MG/DL
CO2 SERPL-SCNC: 27 MMOL/L
COLOR: YELLOW
CREAT SERPL-MCNC: 0.64 MG/DL
CREAT SPEC-SCNC: 211 MG/DL
EGFR: 96 ML/MIN/1.73M2
EOSINOPHIL # BLD AUTO: 0.19 K/UL
EOSINOPHIL NFR BLD AUTO: 2.6 %
ESTIMATED AVERAGE GLUCOSE: 126 MG/DL
FOLATE SERPL-MCNC: 16.8 NG/ML
GLUCOSE QUALITATIVE U: NEGATIVE
GLUCOSE SERPL-MCNC: 106 MG/DL
HBA1C MFR BLD HPLC: 6 %
HCT VFR BLD CALC: 42.3 %
HDLC SERPL-MCNC: 65 MG/DL
HGB BLD-MCNC: 13.8 G/DL
IMM GRANULOCYTES NFR BLD AUTO: 0.7 %
KETONES URINE: NEGATIVE
LDLC SERPL CALC-MCNC: 103 MG/DL
LEUKOCYTE ESTERASE URINE: NEGATIVE
LYMPHOCYTES # BLD AUTO: 2.77 K/UL
LYMPHOCYTES NFR BLD AUTO: 38.5 %
MAN DIFF?: NORMAL
MCHC RBC-ENTMCNC: 28.1 PG
MCHC RBC-ENTMCNC: 32.6 G/DL
MCV RBC AUTO: 86.2 FL
MICROALBUMIN 24H UR DL<=1MG/L-MCNC: <1.2 MG/DL
MICROALBUMIN/CREAT 24H UR-RTO: NORMAL MG/G
MONOCYTES # BLD AUTO: 0.4 K/UL
MONOCYTES NFR BLD AUTO: 5.6 %
NEUTROPHILS # BLD AUTO: 3.72 K/UL
NEUTROPHILS NFR BLD AUTO: 51.6 %
NITRITE URINE: NEGATIVE
NONHDLC SERPL-MCNC: 130 MG/DL
PH URINE: 7.5
PLATELET # BLD AUTO: 304 K/UL
POTASSIUM SERPL-SCNC: 3.5 MMOL/L
PROT SERPL-MCNC: 6.8 G/DL
PROTEIN URINE: NEGATIVE
RBC # BLD: 4.91 M/UL
RBC # FLD: 14.3 %
SODIUM SERPL-SCNC: 143 MMOL/L
SPECIFIC GRAVITY URINE: 1.02
T4 FREE SERPL-MCNC: 1.2 NG/DL
TRIGL SERPL-MCNC: 156 MG/DL
TSH SERPL-ACNC: 0.99 UIU/ML
UROBILINOGEN URINE: NORMAL
VIT B12 SERPL-MCNC: 1501 PG/ML
WBC # FLD AUTO: 7.2 K/UL

## 2024-12-14 ENCOUNTER — TRANSCRIPTION ENCOUNTER (OUTPATIENT)
Age: 69
End: 2024-12-14

## 2024-12-24 ENCOUNTER — TRANSCRIPTION ENCOUNTER (OUTPATIENT)
Age: 69
End: 2024-12-24

## 2025-01-24 ENCOUNTER — APPOINTMENT (OUTPATIENT)
Dept: ENDOCRINOLOGY | Facility: CLINIC | Age: 70
End: 2025-01-24

## 2025-01-24 VITALS
BODY MASS INDEX: 31.22 KG/M2 | DIASTOLIC BLOOD PRESSURE: 62 MMHG | OXYGEN SATURATION: 98 % | HEIGHT: 60 IN | WEIGHT: 159 LBS | SYSTOLIC BLOOD PRESSURE: 116 MMHG | HEART RATE: 84 BPM

## 2025-01-24 DIAGNOSIS — E66.811 OBESITY, CLASS 1: ICD-10-CM

## 2025-01-24 DIAGNOSIS — R73.03 PREDIABETES.: ICD-10-CM

## 2025-01-24 DIAGNOSIS — E78.5 HYPERLIPIDEMIA, UNSPECIFIED: ICD-10-CM

## 2025-01-24 DIAGNOSIS — I10 ESSENTIAL (PRIMARY) HYPERTENSION: ICD-10-CM

## 2025-01-24 PROCEDURE — 99214 OFFICE O/P EST MOD 30 MIN: CPT

## 2025-01-24 PROCEDURE — G2211 COMPLEX E/M VISIT ADD ON: CPT

## 2025-01-31 PROBLEM — E66.811 CLASS 1 OBESITY: Status: ACTIVE | Noted: 2024-09-20

## 2025-02-14 ENCOUNTER — APPOINTMENT (OUTPATIENT)
Dept: INTERNAL MEDICINE | Facility: CLINIC | Age: 70
End: 2025-02-14
Payer: MEDICARE

## 2025-02-14 DIAGNOSIS — K21.9 GASTRO-ESOPHAGEAL REFLUX DISEASE W/OUT ESOPHAGITIS: ICD-10-CM

## 2025-02-14 DIAGNOSIS — F41.9 ANXIETY DISORDER, UNSPECIFIED: ICD-10-CM

## 2025-02-14 DIAGNOSIS — L65.9 NONSCARRING HAIR LOSS, UNSPECIFIED: ICD-10-CM

## 2025-02-14 DIAGNOSIS — R92.30 DENSE BREASTS, UNSPECIFIED: ICD-10-CM

## 2025-02-14 DIAGNOSIS — M75.02 ADHESIVE CAPSULITIS OF LEFT SHOULDER: ICD-10-CM

## 2025-02-14 DIAGNOSIS — M75.42 IMPINGEMENT SYNDROME OF LEFT SHOULDER: ICD-10-CM

## 2025-02-14 DIAGNOSIS — E78.5 HYPERLIPIDEMIA, UNSPECIFIED: ICD-10-CM

## 2025-02-14 DIAGNOSIS — Z00.00 ENCOUNTER FOR GENERAL ADULT MEDICAL EXAMINATION W/OUT ABNORMAL FINDINGS: ICD-10-CM

## 2025-02-14 DIAGNOSIS — M75.41 IMPINGEMENT SYNDROME OF RIGHT SHOULDER: ICD-10-CM

## 2025-02-14 PROCEDURE — 99214 OFFICE O/P EST MOD 30 MIN: CPT

## 2025-02-14 PROCEDURE — G2211 COMPLEX E/M VISIT ADD ON: CPT

## 2025-02-17 VITALS — SYSTOLIC BLOOD PRESSURE: 120 MMHG | DIASTOLIC BLOOD PRESSURE: 60 MMHG

## 2025-02-17 PROBLEM — L65.9 HAIR LOSS: Status: ACTIVE | Noted: 2025-02-14

## 2025-03-24 ENCOUNTER — TRANSCRIPTION ENCOUNTER (OUTPATIENT)
Age: 70
End: 2025-03-24

## 2025-04-07 ENCOUNTER — TRANSCRIPTION ENCOUNTER (OUTPATIENT)
Age: 70
End: 2025-04-07

## 2025-04-09 ENCOUNTER — TRANSCRIPTION ENCOUNTER (OUTPATIENT)
Age: 70
End: 2025-04-09

## 2025-04-09 ENCOUNTER — NON-APPOINTMENT (OUTPATIENT)
Age: 70
End: 2025-04-09

## 2025-04-16 ENCOUNTER — TRANSCRIPTION ENCOUNTER (OUTPATIENT)
Age: 70
End: 2025-04-16

## 2025-04-17 ENCOUNTER — TRANSCRIPTION ENCOUNTER (OUTPATIENT)
Age: 70
End: 2025-04-17

## 2025-04-18 ENCOUNTER — NON-APPOINTMENT (OUTPATIENT)
Age: 70
End: 2025-04-18

## 2025-04-18 ENCOUNTER — APPOINTMENT (OUTPATIENT)
Dept: ENDOCRINOLOGY | Facility: CLINIC | Age: 70
End: 2025-04-18
Payer: MEDICARE

## 2025-04-18 VITALS
OXYGEN SATURATION: 98 % | HEIGHT: 60 IN | DIASTOLIC BLOOD PRESSURE: 68 MMHG | BODY MASS INDEX: 31.8 KG/M2 | WEIGHT: 162 LBS | HEART RATE: 86 BPM | SYSTOLIC BLOOD PRESSURE: 126 MMHG

## 2025-04-18 DIAGNOSIS — R73.03 PREDIABETES.: ICD-10-CM

## 2025-04-18 DIAGNOSIS — E66.811 OBESITY, CLASS 1: ICD-10-CM

## 2025-04-18 DIAGNOSIS — I10 ESSENTIAL (PRIMARY) HYPERTENSION: ICD-10-CM

## 2025-04-18 DIAGNOSIS — E78.5 HYPERLIPIDEMIA, UNSPECIFIED: ICD-10-CM

## 2025-04-18 PROCEDURE — G2211 COMPLEX E/M VISIT ADD ON: CPT

## 2025-04-18 PROCEDURE — 99214 OFFICE O/P EST MOD 30 MIN: CPT

## 2025-04-21 ENCOUNTER — NON-APPOINTMENT (OUTPATIENT)
Age: 70
End: 2025-04-21

## 2025-04-21 LAB
ALBUMIN SERPL ELPH-MCNC: 4.7 G/DL
ALP BLD-CCNC: 83 U/L
ALT SERPL-CCNC: 25 U/L
ANION GAP SERPL CALC-SCNC: 20 MMOL/L
APPEARANCE: CLEAR
AST SERPL-CCNC: 20 U/L
BACTERIA UR CULT: NORMAL
BACTERIA: NEGATIVE /HPF
BASOPHILS # BLD AUTO: 0.09 K/UL
BASOPHILS NFR BLD AUTO: 1 %
BILIRUB SERPL-MCNC: 0.3 MG/DL
BILIRUBIN URINE: NEGATIVE
BLOOD URINE: NEGATIVE
BUN SERPL-MCNC: 17 MG/DL
CALCIUM SERPL-MCNC: 10.1 MG/DL
CAST: 0 /LPF
CHLORIDE SERPL-SCNC: 98 MMOL/L
CHOLEST SERPL-MCNC: 193 MG/DL
CO2 SERPL-SCNC: 25 MMOL/L
COLOR: YELLOW
CREAT SERPL-MCNC: 0.66 MG/DL
CREAT SPEC-SCNC: 136 MG/DL
EGFRCR SERPLBLD CKD-EPI 2021: 95 ML/MIN/1.73M2
EOSINOPHIL # BLD AUTO: 0.19 K/UL
EOSINOPHIL NFR BLD AUTO: 2.1 %
EPITHELIAL CELLS: 2 /HPF
ESTIMATED AVERAGE GLUCOSE: 126 MG/DL
FERRITIN SERPL-MCNC: 121 NG/ML
FOLATE SERPL-MCNC: 12.6 NG/ML
GLUCOSE QUALITATIVE U: NEGATIVE MG/DL
GLUCOSE SERPL-MCNC: 93 MG/DL
HBA1C MFR BLD HPLC: 6 %
HCT VFR BLD CALC: 41 %
HDLC SERPL-MCNC: 74 MG/DL
HGB BLD-MCNC: 13.1 G/DL
IMM GRANULOCYTES NFR BLD AUTO: 0.9 %
IRON SATN MFR SERPL: 21 %
IRON SERPL-MCNC: 82 UG/DL
KETONES URINE: NEGATIVE MG/DL
LDLC SERPL-MCNC: 95 MG/DL
LEUKOCYTE ESTERASE URINE: NEGATIVE
LYMPHOCYTES # BLD AUTO: 3.92 K/UL
LYMPHOCYTES NFR BLD AUTO: 42.4 %
MAN DIFF?: NORMAL
MCHC RBC-ENTMCNC: 27.3 PG
MCHC RBC-ENTMCNC: 32 G/DL
MCV RBC AUTO: 85.4 FL
MICROALBUMIN 24H UR DL<=1MG/L-MCNC: <1.2 MG/DL
MICROALBUMIN/CREAT 24H UR-RTO: NORMAL MG/G
MICROSCOPIC-UA: NORMAL
MONOCYTES # BLD AUTO: 0.67 K/UL
MONOCYTES NFR BLD AUTO: 7.3 %
NEUTROPHILS # BLD AUTO: 4.29 K/UL
NEUTROPHILS NFR BLD AUTO: 46.3 %
NITRITE URINE: NEGATIVE
NONHDLC SERPL-MCNC: 120 MG/DL
PH URINE: 7
PLATELET # BLD AUTO: 292 K/UL
POTASSIUM SERPL-SCNC: 3.1 MMOL/L
PROT SERPL-MCNC: 7.2 G/DL
PROTEIN URINE: NEGATIVE MG/DL
RBC # BLD: 4.8 M/UL
RBC # FLD: 13.9 %
RED BLOOD CELLS URINE: 1 /HPF
SODIUM SERPL-SCNC: 143 MMOL/L
SPECIFIC GRAVITY URINE: 1.02
T4 FREE SERPL-MCNC: 1.2 NG/DL
TIBC SERPL-MCNC: 390 UG/DL
TRIGL SERPL-MCNC: 144 MG/DL
TSH SERPL-ACNC: 0.95 UIU/ML
UIBC SERPL-MCNC: 308 UG/DL
UROBILINOGEN URINE: 0.2 MG/DL
VIT B12 SERPL-MCNC: 952 PG/ML
WBC # FLD AUTO: 9.24 K/UL
WHITE BLOOD CELLS URINE: 0 /HPF

## 2025-04-24 LAB — ANA SER IF-ACNC: NEGATIVE

## 2025-04-29 ENCOUNTER — NON-APPOINTMENT (OUTPATIENT)
Age: 70
End: 2025-04-29

## 2025-05-05 ENCOUNTER — TRANSCRIPTION ENCOUNTER (OUTPATIENT)
Age: 70
End: 2025-05-05

## 2025-05-07 ENCOUNTER — TRANSCRIPTION ENCOUNTER (OUTPATIENT)
Age: 70
End: 2025-05-07

## 2025-05-30 ENCOUNTER — APPOINTMENT (OUTPATIENT)
Dept: INTERNAL MEDICINE | Facility: CLINIC | Age: 70
End: 2025-05-30
Payer: MEDICARE

## 2025-05-30 VITALS — SYSTOLIC BLOOD PRESSURE: 120 MMHG | WEIGHT: 162 LBS | DIASTOLIC BLOOD PRESSURE: 80 MMHG | BODY MASS INDEX: 31.64 KG/M2

## 2025-05-30 DIAGNOSIS — R09.89 OTHER SPECIFIED SYMPTOMS AND SIGNS INVOLVING THE CIRCULATORY AND RESPIRATORY SYSTEMS: ICD-10-CM

## 2025-05-30 DIAGNOSIS — Z86.79 PERSONAL HISTORY OF OTHER DISEASES OF THE CIRCULATORY SYSTEM: ICD-10-CM

## 2025-05-30 DIAGNOSIS — Z87.898 PERSONAL HISTORY OF OTHER SPECIFIED CONDITIONS: ICD-10-CM

## 2025-05-30 DIAGNOSIS — I10 ESSENTIAL (PRIMARY) HYPERTENSION: ICD-10-CM

## 2025-05-30 DIAGNOSIS — M54.12 RADICULOPATHY, CERVICAL REGION: ICD-10-CM

## 2025-05-30 DIAGNOSIS — E87.6 HYPOKALEMIA: ICD-10-CM

## 2025-05-30 DIAGNOSIS — F41.9 ANXIETY DISORDER, UNSPECIFIED: ICD-10-CM

## 2025-05-30 DIAGNOSIS — R10.2 PELVIC AND PERINEAL PAIN: ICD-10-CM

## 2025-05-30 PROCEDURE — G2211 COMPLEX E/M VISIT ADD ON: CPT

## 2025-05-30 PROCEDURE — 99214 OFFICE O/P EST MOD 30 MIN: CPT

## 2025-05-30 RX ORDER — POTASSIUM CHLORIDE 1500 MG/1
20 TABLET, EXTENDED RELEASE ORAL DAILY
Qty: 180 | Refills: 1 | Status: ACTIVE | COMMUNITY
Start: 2025-05-30 | End: 1900-01-01

## 2025-05-31 PROBLEM — M54.12 ACUTE CERVICAL RADICULOPATHY: Status: RESOLVED | Noted: 2024-11-22 | Resolved: 2025-05-31

## 2025-05-31 PROBLEM — E87.6 HYPOKALEMIA: Status: ACTIVE | Noted: 2025-05-30

## 2025-05-31 PROBLEM — R09.89 BRUIT: Status: RESOLVED | Noted: 2018-07-27 | Resolved: 2025-05-31

## 2025-05-31 PROBLEM — R10.2 SUPRAPUBIC DISCOMFORT: Status: RESOLVED | Noted: 2023-11-17 | Resolved: 2025-05-31

## 2025-05-31 PROBLEM — Z86.79 HISTORY OF ATRIAL FIBRILLATION: Status: RESOLVED | Noted: 2021-08-24 | Resolved: 2025-05-31

## 2025-05-31 PROBLEM — Z87.898 HISTORY OF DIZZINESS: Status: RESOLVED | Noted: 2023-09-08 | Resolved: 2025-05-31

## 2025-06-13 ENCOUNTER — TRANSCRIPTION ENCOUNTER (OUTPATIENT)
Age: 70
End: 2025-06-13

## 2025-06-16 ENCOUNTER — TRANSCRIPTION ENCOUNTER (OUTPATIENT)
Age: 70
End: 2025-06-16

## 2025-06-20 ENCOUNTER — LABORATORY RESULT (OUTPATIENT)
Age: 70
End: 2025-06-20

## 2025-06-20 ENCOUNTER — NON-APPOINTMENT (OUTPATIENT)
Age: 70
End: 2025-06-20

## 2025-06-20 ENCOUNTER — APPOINTMENT (OUTPATIENT)
Dept: ENDOCRINOLOGY | Facility: CLINIC | Age: 70
End: 2025-06-20
Payer: MEDICARE

## 2025-06-20 VITALS
BODY MASS INDEX: 31.41 KG/M2 | HEIGHT: 60 IN | HEART RATE: 76 BPM | WEIGHT: 160 LBS | DIASTOLIC BLOOD PRESSURE: 70 MMHG | OXYGEN SATURATION: 97 % | SYSTOLIC BLOOD PRESSURE: 104 MMHG

## 2025-06-20 PROCEDURE — 99214 OFFICE O/P EST MOD 30 MIN: CPT

## 2025-06-23 ENCOUNTER — TRANSCRIPTION ENCOUNTER (OUTPATIENT)
Age: 70
End: 2025-06-23

## 2025-07-19 ENCOUNTER — TRANSCRIPTION ENCOUNTER (OUTPATIENT)
Age: 70
End: 2025-07-19

## 2025-07-29 ENCOUNTER — TRANSCRIPTION ENCOUNTER (OUTPATIENT)
Age: 70
End: 2025-07-29